# Patient Record
Sex: MALE | Race: WHITE | Employment: UNEMPLOYED | URBAN - METROPOLITAN AREA
[De-identification: names, ages, dates, MRNs, and addresses within clinical notes are randomized per-mention and may not be internally consistent; named-entity substitution may affect disease eponyms.]

---

## 2013-08-15 LAB
CHOLESTEROL, TOTAL: NORMAL
CHOLESTEROL/HDL RATIO: NORMAL
HDLC SERPL-MCNC: NORMAL MG/DL
LDL CHOLESTEROL CALCULATED: NORMAL
TRIGL SERPL-MCNC: NORMAL MG/DL
VLDLC SERPL CALC-MCNC: NORMAL MG/DL

## 2014-03-03 LAB

## 2019-07-25 ENCOUNTER — OFFICE VISIT (OUTPATIENT)
Dept: PHYSICAL MEDICINE AND REHAB | Age: 39
End: 2019-07-25
Payer: COMMERCIAL

## 2019-07-25 VITALS
HEIGHT: 72 IN | SYSTOLIC BLOOD PRESSURE: 138 MMHG | HEART RATE: 67 BPM | DIASTOLIC BLOOD PRESSURE: 95 MMHG | WEIGHT: 200 LBS | BODY MASS INDEX: 27.09 KG/M2

## 2019-07-25 DIAGNOSIS — M75.21 BICEPS TENDONITIS ON RIGHT: Primary | ICD-10-CM

## 2019-07-25 DIAGNOSIS — M19.011 ARTHRITIS OF RIGHT ACROMIOCLAVICULAR JOINT: ICD-10-CM

## 2019-07-25 PROCEDURE — 20550 NJX 1 TENDON SHEATH/LIGAMENT: CPT | Performed by: PHYSICAL MEDICINE & REHABILITATION

## 2019-07-25 PROCEDURE — 99204 OFFICE O/P NEW MOD 45 MIN: CPT | Performed by: PHYSICAL MEDICINE & REHABILITATION

## 2019-07-25 RX ORDER — LIDOCAINE HYDROCHLORIDE 10 MG/ML
2 INJECTION, SOLUTION INFILTRATION; PERINEURAL ONCE
Status: COMPLETED | OUTPATIENT
Start: 2019-07-25 | End: 2019-07-25

## 2019-07-25 RX ORDER — TRIAMCINOLONE ACETONIDE 40 MG/ML
40 INJECTION, SUSPENSION INTRA-ARTICULAR; INTRAMUSCULAR ONCE
Status: COMPLETED | OUTPATIENT
Start: 2019-07-25 | End: 2019-07-25

## 2019-07-25 RX ORDER — CITALOPRAM 10 MG/1
20 TABLET ORAL DAILY
Status: ON HOLD | COMMUNITY
Start: 2019-07-24 | End: 2021-09-02 | Stop reason: HOSPADM

## 2019-07-25 RX ORDER — LISINOPRIL 10 MG/1
10 TABLET ORAL DAILY
Refills: 0 | Status: ON HOLD | COMMUNITY
Start: 2019-04-17 | End: 2021-08-24 | Stop reason: ALTCHOICE

## 2019-07-25 RX ADMIN — LIDOCAINE HYDROCHLORIDE 2 ML: 10 INJECTION, SOLUTION INFILTRATION; PERINEURAL at 15:15

## 2019-07-25 RX ADMIN — TRIAMCINOLONE ACETONIDE 40 MG: 40 INJECTION, SUSPENSION INTRA-ARTICULAR; INTRAMUSCULAR at 15:16

## 2019-08-27 ENCOUNTER — TELEPHONE (OUTPATIENT)
Dept: PHYSICAL MEDICINE AND REHAB | Age: 39
End: 2019-08-27

## 2021-08-24 ENCOUNTER — HOSPITAL ENCOUNTER (INPATIENT)
Age: 41
LOS: 9 days | Discharge: HOME | DRG: 885 | End: 2021-09-02
Attending: STUDENT IN AN ORGANIZED HEALTH CARE EDUCATION/TRAINING PROGRAM | Admitting: PSYCHIATRY & NEUROLOGY
Payer: COMMERCIAL

## 2021-08-24 DIAGNOSIS — R45.851 SUICIDAL IDEATION: Primary | ICD-10-CM

## 2021-08-24 PROBLEM — F32.A DEPRESSION WITH SUICIDAL IDEATION: Status: ACTIVE | Noted: 2021-08-24

## 2021-08-24 LAB
ACETAMINOPHEN LEVEL: <5 MCG/ML (ref 10–30)
ALBUMIN SERPL-MCNC: 3.9 G/DL (ref 3.5–5.2)
ALP BLD-CCNC: 99 U/L (ref 40–129)
ALT SERPL-CCNC: 32 U/L (ref 0–40)
AMPHETAMINE SCREEN, URINE: NOT DETECTED
ANION GAP SERPL CALCULATED.3IONS-SCNC: 8 MMOL/L (ref 7–16)
AST SERPL-CCNC: 18 U/L (ref 0–39)
BARBITURATE SCREEN URINE: NOT DETECTED
BASOPHILS ABSOLUTE: 0.08 E9/L (ref 0–0.2)
BASOPHILS RELATIVE PERCENT: 0.9 % (ref 0–2)
BENZODIAZEPINE SCREEN, URINE: NOT DETECTED
BILIRUB SERPL-MCNC: 0.3 MG/DL (ref 0–1.2)
BILIRUBIN URINE: NEGATIVE
BLOOD, URINE: NEGATIVE
BUN BLDV-MCNC: 10 MG/DL (ref 6–20)
CALCIUM SERPL-MCNC: 9.5 MG/DL (ref 8.6–10.2)
CANNABINOID SCREEN URINE: POSITIVE
CHLORIDE BLD-SCNC: 105 MMOL/L (ref 98–107)
CLARITY: CLEAR
CO2: 28 MMOL/L (ref 22–29)
COCAINE METABOLITE SCREEN URINE: NOT DETECTED
COLOR: YELLOW
CREAT SERPL-MCNC: 0.9 MG/DL (ref 0.7–1.2)
EOSINOPHILS ABSOLUTE: 0.15 E9/L (ref 0.05–0.5)
EOSINOPHILS RELATIVE PERCENT: 1.6 % (ref 0–6)
ETHANOL: <10 MG/DL (ref 0–0.08)
FENTANYL SCREEN, URINE: NOT DETECTED
GFR AFRICAN AMERICAN: >60
GFR NON-AFRICAN AMERICAN: >60 ML/MIN/1.73
GLUCOSE BLD-MCNC: 98 MG/DL (ref 74–99)
GLUCOSE URINE: NEGATIVE MG/DL
HCT VFR BLD CALC: 45.7 % (ref 37–54)
HEMOGLOBIN: 15.6 G/DL (ref 12.5–16.5)
IMMATURE GRANULOCYTES #: 0.03 E9/L
IMMATURE GRANULOCYTES %: 0.3 % (ref 0–5)
INFLUENZA A: NOT DETECTED
INFLUENZA B: NOT DETECTED
KETONES, URINE: NEGATIVE MG/DL
LEUKOCYTE ESTERASE, URINE: NEGATIVE
LYMPHOCYTES ABSOLUTE: 2.7 E9/L (ref 1.5–4)
LYMPHOCYTES RELATIVE PERCENT: 29.1 % (ref 20–42)
Lab: ABNORMAL
MCH RBC QN AUTO: 30.8 PG (ref 26–35)
MCHC RBC AUTO-ENTMCNC: 34.1 % (ref 32–34.5)
MCV RBC AUTO: 90.3 FL (ref 80–99.9)
METHADONE SCREEN, URINE: NOT DETECTED
MONOCYTES ABSOLUTE: 0.8 E9/L (ref 0.1–0.95)
MONOCYTES RELATIVE PERCENT: 8.6 % (ref 2–12)
NEUTROPHILS ABSOLUTE: 5.51 E9/L (ref 1.8–7.3)
NEUTROPHILS RELATIVE PERCENT: 59.5 % (ref 43–80)
NITRITE, URINE: NEGATIVE
OPIATE SCREEN URINE: NOT DETECTED
OXYCODONE URINE: NOT DETECTED
PDW BLD-RTO: 11.9 FL (ref 11.5–15)
PH UA: 6.5 (ref 5–9)
PHENCYCLIDINE SCREEN URINE: NOT DETECTED
PLATELET # BLD: 390 E9/L (ref 130–450)
PMV BLD AUTO: 9.8 FL (ref 7–12)
POTASSIUM SERPL-SCNC: 4.2 MMOL/L (ref 3.5–5)
PROTEIN UA: NEGATIVE MG/DL
RBC # BLD: 5.06 E12/L (ref 3.8–5.8)
SALICYLATE, SERUM: <0.3 MG/DL (ref 0–30)
SARS-COV-2 RNA, RT PCR: NOT DETECTED
SODIUM BLD-SCNC: 141 MMOL/L (ref 132–146)
SPECIFIC GRAVITY UA: 1.01 (ref 1–1.03)
TOTAL PROTEIN: 7.6 G/DL (ref 6.4–8.3)
TRICYCLIC ANTIDEPRESSANTS SCREEN SERUM: NEGATIVE NG/ML
UROBILINOGEN, URINE: 0.2 E.U./DL
WBC # BLD: 9.3 E9/L (ref 4.5–11.5)

## 2021-08-24 PROCEDURE — 80053 COMPREHEN METABOLIC PANEL: CPT

## 2021-08-24 PROCEDURE — 80179 DRUG ASSAY SALICYLATE: CPT

## 2021-08-24 PROCEDURE — 1240000000 HC EMOTIONAL WELLNESS R&B

## 2021-08-24 PROCEDURE — 93005 ELECTROCARDIOGRAM TRACING: CPT | Performed by: PHYSICIAN ASSISTANT

## 2021-08-24 PROCEDURE — 6370000000 HC RX 637 (ALT 250 FOR IP): Performed by: PSYCHIATRY & NEUROLOGY

## 2021-08-24 PROCEDURE — 99284 EMERGENCY DEPT VISIT MOD MDM: CPT

## 2021-08-24 PROCEDURE — 80143 DRUG ASSAY ACETAMINOPHEN: CPT

## 2021-08-24 PROCEDURE — 80307 DRUG TEST PRSMV CHEM ANLYZR: CPT

## 2021-08-24 PROCEDURE — 87636 SARSCOV2 & INF A&B AMP PRB: CPT

## 2021-08-24 PROCEDURE — 85025 COMPLETE CBC W/AUTO DIFF WBC: CPT

## 2021-08-24 PROCEDURE — 81003 URINALYSIS AUTO W/O SCOPE: CPT

## 2021-08-24 PROCEDURE — 82077 ASSAY SPEC XCP UR&BREATH IA: CPT

## 2021-08-24 RX ORDER — MAGNESIUM HYDROXIDE/ALUMINUM HYDROXICE/SIMETHICONE 120; 1200; 1200 MG/30ML; MG/30ML; MG/30ML
30 SUSPENSION ORAL PRN
Status: DISCONTINUED | OUTPATIENT
Start: 2021-08-24 | End: 2021-09-02 | Stop reason: HOSPADM

## 2021-08-24 RX ORDER — ACETAMINOPHEN 325 MG/1
650 TABLET ORAL EVERY 6 HOURS PRN
Status: DISCONTINUED | OUTPATIENT
Start: 2021-08-24 | End: 2021-09-02 | Stop reason: HOSPADM

## 2021-08-24 RX ORDER — TRAZODONE HYDROCHLORIDE 50 MG/1
50 TABLET ORAL NIGHTLY PRN
Status: DISCONTINUED | OUTPATIENT
Start: 2021-08-24 | End: 2021-09-02 | Stop reason: HOSPADM

## 2021-08-24 RX ORDER — HALOPERIDOL 5 MG
5 TABLET ORAL EVERY 6 HOURS PRN
Status: DISCONTINUED | OUTPATIENT
Start: 2021-08-24 | End: 2021-09-02 | Stop reason: HOSPADM

## 2021-08-24 RX ORDER — HALOPERIDOL 5 MG/ML
5 INJECTION INTRAMUSCULAR EVERY 6 HOURS PRN
Status: DISCONTINUED | OUTPATIENT
Start: 2021-08-24 | End: 2021-09-02 | Stop reason: HOSPADM

## 2021-08-24 RX ORDER — TRAZODONE HYDROCHLORIDE 50 MG/1
50 TABLET ORAL NIGHTLY PRN
Status: DISCONTINUED | OUTPATIENT
Start: 2021-08-25 | End: 2021-08-24

## 2021-08-24 RX ORDER — NICOTINE 21 MG/24HR
1 PATCH, TRANSDERMAL 24 HOURS TRANSDERMAL DAILY
Status: DISCONTINUED | OUTPATIENT
Start: 2021-08-25 | End: 2021-08-25

## 2021-08-24 RX ORDER — ACETAMINOPHEN 325 MG/1
650 TABLET ORAL ONCE
Status: DISCONTINUED | OUTPATIENT
Start: 2021-08-24 | End: 2021-09-02 | Stop reason: HOSPADM

## 2021-08-24 RX ORDER — HYDROXYZINE PAMOATE 50 MG/1
50 CAPSULE ORAL 3 TIMES DAILY PRN
Status: DISCONTINUED | OUTPATIENT
Start: 2021-08-24 | End: 2021-09-02 | Stop reason: HOSPADM

## 2021-08-24 RX ADMIN — TRAZODONE HYDROCHLORIDE 50 MG: 50 TABLET ORAL at 22:42

## 2021-08-24 ASSESSMENT — SLEEP AND FATIGUE QUESTIONNAIRES
DO YOU USE A SLEEP AID: YES
DIFFICULTY ARISING: YES
DIFFICULTY STAYING ASLEEP: YES
DO YOU HAVE DIFFICULTY SLEEPING: YES
DIFFICULTY FALLING ASLEEP: YES
SLEEP PATTERN: INSOMNIA;DIFFICULTY FALLING ASLEEP
AVERAGE NUMBER OF SLEEP HOURS: 3
RESTFUL SLEEP: NO

## 2021-08-24 ASSESSMENT — LIFESTYLE VARIABLES: HISTORY_ALCOHOL_USE: NO

## 2021-08-24 ASSESSMENT — PATIENT HEALTH QUESTIONNAIRE - PHQ9: SUM OF ALL RESPONSES TO PHQ QUESTIONS 1-9: 17

## 2021-08-24 ASSESSMENT — PAIN SCALES - GENERAL: PAINLEVEL_OUTOF10: 0

## 2021-08-24 NOTE — ED NOTES
FIRST PROVIDER CONTACT ASSESSMENT NOTE                                                                                                Department of Emergency Medicine                                                      First Provider Note  21  3:06 PM EDT  NAME: Mango Cabrera  : 1980  MRN: [id number]    Chief Complaint: No chief complaint on file. History of Present Illness:   Mango Cabrera is a 41 y.o. male who presents to the ED for suicidal ideations for the last week. Patient states \"I just feel like everyone is out to get me. \"  Patient states \"I would shoot myself with a shotgun. \"  Patient states he recently started back on Celexa over the last 3 days. Patient denies any homicidal ideations. Patient states he took Remeron to help him sleep but is not on the medication any longer. Patient does not have a counselor follow with psychiatry. Patient states that he has struggled with anxiety and depression. Focused Physical Exam:  VS:    ED Triage Vitals [21 1504]   BP Temp Temp src Pulse Resp SpO2 Height Weight   -- 97.7 °F (36.5 °C) -- 88 -- 96 % -- --        General: Alert and in no apparent distress. Medical History:  has a past medical history of Hypertension. Surgical History:  has a past surgical history that includes Neck surgery; Hernia repair; Finger surgery (Left); and hernia repair. Social History:  reports that he has been smoking cigarettes. He has a 30.00 pack-year smoking history. He has quit using smokeless tobacco. He reports current alcohol use. Family History: family history includes COPD in his father; Hypertension in his father. Allergies: Patient has no known allergies.      Initial Plan of Care:  Initiate Treatment-Testing, Proceed toTreatment Area When Bed Available for ED Attending/MLP to Continue Care    -------------------------------------------------END OF FIRST PROVIDER CONTACT ASSESSMENT NOTE--------------------------------------------------------  Electronically signed by Natasha Smith PA-C   DD: 8/24/21       Natasha Smith PA-C  08/24/21 [id number] AndrewCHENG  08/24/21 [id number]

## 2021-08-24 NOTE — ED PROVIDER NOTES
Department of Emergency Medicine   ED  Provider Note  Admit Date/RoomTime: 8/24/2021  3:13 PM  ED Room: [address] [place] [address]          History of Present Illness:  8/24/21, Time: 4:19 PM EDT  Chief Complaint   Patient presents with    Suicidal     +SI/-HI, plan to kill self with a gun         Aditi Early is a 41 y.o. male presenting to the ED for Suicidal ideation with plan to shoot himself with a shotgun. The patient is self aware. Denies hallucinations or delusions. Ever since losing his job a couple of years ago he has had thoughts that he is hopeless as well as thoughts of anxiety and depression given that he has not been employed. He feels he is a burden to his family as he is not contributing. Denies any history of suicide attempt in the past but has struggled with anxiety and depression. The patient's wife is at the bedside who states that she has noticed a progressive worsening of his depression as well as he admitting that he has suicidal ideation to her. They do have firearms that are unsecured in the home that he would have access to as well. Review of Systems:  Constitutional: Denies fevers  Eyes: Denies blurry vision  ENT: Denies sore throat  Cardiovascular: Denies chest pain  Respiratory: Denies shortness of breath  Gastrointestinal: Denies abdominal pain  Genitourinary: Denies dysuria  Musculoskeletal: Denies myalgias  Integumentary: Denies rashes  Psychiatric: Positive for suicidal ideation    --------------------------------------------- PAST HISTORY ---------------------------------------------  Past Medical History:  has a past medical history of Hypertension. Past Surgical History:  has a past surgical history that includes Neck surgery; Hernia repair; Finger surgery (Left); and hernia repair. Social History:  reports that he has been smoking cigarettes. He has a 30.00 pack-year smoking history. He has quit using smokeless tobacco. He reports current alcohol use.     Family History: family history includes COPD in his father; Hypertension in his father. . Unless otherwise noted, family history is non contributory    The patients home medications have been reviewed. Allergies: Patient has no known allergies. I have reviewed the past medical history, past surgical history, social history, and family history    ---------------------------------------------------PHYSICAL EXAM--------------------------------------    Constitutional: Appears in no distress  Head: Normocephalic  Eyes: No conjunctial injection  ENT: Moist mucous membranes  Neck: Trachea midline  Respiratory: Nonlabored respirations, no tachypnea  Cardiovascular: Regular rate. Regular rhythm. No murmurs, no gallops, no rubs. Gastrointestinal: Nonsistendended abdomen. Musculoskeletal: Moves all extremities  Skin: No diaphoresis on visualized skin  Neurologic: Alert, symmetric facies, no aphasia  Psychiatric: Patient has a linear thought process nontangential, no delusions or hallucinations, has suicidal ideation with plan, denies homicidal ideation    -------------------------------------------------- RESULTS -------------------------------------------------  I have personally reviewed all laboratory and imaging results for this patient. Results are listed below.      LABS: (Lab results interpreted by me)  Results for orders placed or performed during the hospital encounter of 08/24/21   COVID-19 & Influenza Combo    Specimen: Nasopharyngeal Swab   Result Value Ref Range    SARS-CoV-2 RNA, RT PCR NOT DETECTED NOT DETECTED    INFLUENZA A NOT DETECTED NOT DETECTED    INFLUENZA B NOT DETECTED NOT DETECTED   Comprehensive Metabolic Panel   Result Value Ref Range    Sodium 141 132 - 146 mmol/L    Potassium 4.2 3.5 - 5.0 mmol/L    Chloride 105 98 - 107 mmol/L    CO2 28 22 - 29 mmol/L    Anion Gap 8 7 - 16 mmol/L    Glucose 98 74 - 99 mg/dL    BUN 10 6 - 20 mg/dL    CREATININE 0.9 0.7 - 1.2 mg/dL    GFR Non-African American >60 >=60 mL/min/1.73    GFR African American >60     Calcium 9.5 8.6 - 10.2 mg/dL    Total Protein 7.6 6.4 - 8.3 g/dL    Albumin 3.9 3.5 - 5.2 g/dL    Total Bilirubin 0.3 0.0 - 1.2 mg/dL    Alkaline Phosphatase 99 40 - 129 U/L    ALT 32 0 - 40 U/L    AST 18 0 - 39 U/L   CBC Auto Differential   Result Value Ref Range    WBC 9.3 4.5 - 11.5 E9/L    RBC 5.06 3.80 - 5.80 E12/L    Hemoglobin 15.6 12.5 - 16.5 g/dL    Hematocrit 45.7 37.0 - 54.0 %    MCV 90.3 80.0 - 99.9 fL    MCH 30.8 26.0 - 35.0 pg    MCHC 34.1 32.0 - 34.5 %    RDW 11.9 11.5 - 15.0 fL    Platelets 000 544 - 363 E9/L    MPV 9.8 7.0 - 12.0 fL    Neutrophils % 59.5 43.0 - 80.0 %    Immature Granulocytes % 0.3 0.0 - 5.0 %    Lymphocytes % 29.1 20.0 - 42.0 %    Monocytes % 8.6 2.0 - 12.0 %    Eosinophils % 1.6 0.0 - 6.0 %    Basophils % 0.9 0.0 - 2.0 %    Neutrophils Absolute 5.51 1.80 - 7.30 E9/L    Immature Granulocytes # 0.03 E9/L    Lymphocytes Absolute 2.70 1.50 - 4.00 E9/L    Monocytes Absolute 0.80 0.10 - 0.95 E9/L    Eosinophils Absolute 0.15 0.05 - 0.50 E9/L    Basophils Absolute 0.08 0.00 - 0.20 E9/L   Serum Drug Screen   Result Value Ref Range    Ethanol Lvl <10 mg/dL    Acetaminophen Level <5.0 (L) 10.0 - 57.8 mcg/mL    Salicylate, Serum <4.9 0.0 - 30.0 mg/dL    TCA Scrn NEGATIVE Cutoff:300 ng/mL   Urine Drug Screen   Result Value Ref Range    Amphetamine Screen, Urine NOT DETECTED Negative <1000 ng/mL    Barbiturate Screen, Ur NOT DETECTED Negative < 200 ng/mL    Benzodiazepine Screen, Urine NOT DETECTED Negative < 200 ng/mL    Cannabinoid Scrn, Ur POSITIVE (A) Negative < 50ng/mL    Cocaine Metabolite Screen, Urine NOT DETECTED Negative < 300 ng/mL    Opiate Scrn, Ur NOT DETECTED Negative < 300ng/mL    PCP Screen, Urine NOT DETECTED Negative < 25 ng/mL    Methadone Screen, Urine NOT DETECTED Negative <300 ng/mL    Oxycodone Urine NOT DETECTED Negative <100 ng/mL    FENTANYL SCREEN, URINE NOT DETECTED Negative <1 ng/mL    Drug Screen Comment: see below Urinalysis   Result Value Ref Range    Color, UA Yellow Straw/Yellow    Clarity, UA Clear Clear    Glucose, Ur Negative Negative mg/dL    Bilirubin Urine Negative Negative    Ketones, Urine Negative Negative mg/dL    Specific Gravity, UA 1.015 1.005 - 1.030    Blood, Urine Negative Negative    pH, UA 6.5 5.0 - 9.0    Protein, UA Negative Negative mg/dL    Urobilinogen, Urine 0.2 <2.0 E.U./dL    Nitrite, Urine Negative Negative    Leukocyte Esterase, Urine Negative Negative   EKG 12 Lead   Result Value Ref Range    Ventricular Rate 62 BPM    Atrial Rate 62 BPM    P-R Interval 152 ms    QRS Duration 98 ms    Q-T Interval 394 ms    QTc Calculation (Bazett) 399 ms    P Axis 54 degrees    R Axis 66 degrees    T Axis 59 degrees   ,       RADIOLOGY:  Interpreted by Radiologist unless otherwise specified  No orders to display         ------------------------- NURSING NOTES AND VITALS REVIEWED ---------------------------   The nursing notes within the ED encounter and vital signs as below have been reviewed by myself  BP (!) 137/100   Pulse 90   Temp 97.7 °F (36.5 °C)   Resp 17   SpO2 97%     Oxygen Saturation Interpretation: Normal    The patients available past medical records and past encounters were reviewed. ------------------------------ ED COURSE/MEDICAL DECISION MAKING----------------------  Medications   acetaminophen (TYLENOL) tablet 650 mg (has no administration in time range)        This patient's ED course included:a personal history and physicial examination    This patient has remained hemodynamically stable during their ED course. Consultations:  Social Work      Medical Decision Making:   Patient presents with suicidal ideation with plan. Vital signs interpreted by me as mildly hypertensive otherwise normal.    History and physical examination findings consistent with suicidal ideation with plan. I feel that the patient is a danger to himself but not currently danger to others.   I have filled out a pink slip and he will be evaluated by psychiatry. Labs: Unremarkable and reviewed by myself. EKG:  EKG obtained on August 24, 2021 at 1630 9 PM remarkable for ventricular rate of 62 bpm QRS duration is within normal limits at 90 ms QT/QTc is normal at 394/399 ms there are no ST segment elevations or depressions. No T wave inversions. This is interpreted by myself as normal sinus rhythm without ischemia or infarct. Patient is cleared medically for evaluation and further psychiatric placement. Counseling: The emergency provider has spoken with the patient and spouse/SO and discussed todays results, in addition to providing specific details for the plan of care and counseling regarding the diagnosis and prognosis. Questions are answered at this time and they are agreeable with the plan.       --------------------------------- IMPRESSION AND DISPOSITION ---------------------------------    IMPRESSION  1. Suicidal ideation        DISPOSITION  Disposition: Cleared medically for psychiatric evaluation  Patient condition is stable    Dr. Brian DAWSON, am the primary provider of record    Brian Tong DO  Emergency Medicine    NOTE: This report was transcribed using voice recognition software.  Every effort was made to ensure accuracy; however, inadvertent computerized transcription errors may be present         Rita Ohara DO  08/24/21 [id number]

## 2021-08-25 PROBLEM — F31.5 BIPOLAR AFFECTIVE DISORDER, DEPRESSED, SEVERE, WITH PSYCHOTIC BEHAVIOR (HCC): Status: ACTIVE | Noted: 2021-08-25

## 2021-08-25 LAB
EKG ATRIAL RATE: 62 BPM
EKG P AXIS: 54 DEGREES
EKG P-R INTERVAL: 152 MS
EKG Q-T INTERVAL: 394 MS
EKG QRS DURATION: 98 MS
EKG QTC CALCULATION (BAZETT): 399 MS
EKG R AXIS: 66 DEGREES
EKG T AXIS: 59 DEGREES
EKG VENTRICULAR RATE: 62 BPM

## 2021-08-25 PROCEDURE — 1240000000 HC EMOTIONAL WELLNESS R&B

## 2021-08-25 PROCEDURE — 93010 ELECTROCARDIOGRAM REPORT: CPT | Performed by: INTERNAL MEDICINE

## 2021-08-25 PROCEDURE — 6370000000 HC RX 637 (ALT 250 FOR IP): Performed by: PSYCHIATRY & NEUROLOGY

## 2021-08-25 PROCEDURE — 6370000000 HC RX 637 (ALT 250 FOR IP): Performed by: NURSE PRACTITIONER

## 2021-08-25 PROCEDURE — 99221 1ST HOSP IP/OBS SF/LOW 40: CPT | Performed by: NURSE PRACTITIONER

## 2021-08-25 RX ORDER — DIVALPROEX SODIUM 250 MG/1
250 TABLET, DELAYED RELEASE ORAL EVERY 12 HOURS SCHEDULED
Status: DISCONTINUED | OUTPATIENT
Start: 2021-08-25 | End: 2021-08-27

## 2021-08-25 RX ORDER — OLANZAPINE 5 MG/1
5 TABLET ORAL NIGHTLY
Status: DISCONTINUED | OUTPATIENT
Start: 2021-08-25 | End: 2021-08-26

## 2021-08-25 RX ADMIN — NICOTINE POLACRILEX 4 MG: 2 GUM, CHEWING BUCCAL at 10:15

## 2021-08-25 RX ADMIN — DIVALPROEX SODIUM 250 MG: 250 TABLET, DELAYED RELEASE ORAL at 20:39

## 2021-08-25 RX ADMIN — NICOTINE POLACRILEX 2 MG: 2 GUM, CHEWING BUCCAL at 16:51

## 2021-08-25 RX ADMIN — OLANZAPINE 5 MG: 5 TABLET, FILM COATED ORAL at 20:39

## 2021-08-25 RX ADMIN — HYDROXYZINE PAMOATE 50 MG: 50 CAPSULE ORAL at 20:39

## 2021-08-25 RX ADMIN — TRAZODONE HYDROCHLORIDE 50 MG: 50 TABLET ORAL at 20:39

## 2021-08-25 RX ADMIN — DIVALPROEX SODIUM 250 MG: 250 TABLET, DELAYED RELEASE ORAL at 11:36

## 2021-08-25 ASSESSMENT — LIFESTYLE VARIABLES: HISTORY_ALCOHOL_USE: NO

## 2021-08-25 ASSESSMENT — SLEEP AND FATIGUE QUESTIONNAIRES
AVERAGE NUMBER OF SLEEP HOURS: 5
DIFFICULTY FALLING ASLEEP: YES
DO YOU USE A SLEEP AID: YES
DIFFICULTY ARISING: NO
DIFFICULTY STAYING ASLEEP: YES
RESTFUL SLEEP: NO
SLEEP PATTERN: RESTLESSNESS;DIFFICULTY FALLING ASLEEP;DISTURBED/INTERRUPTED SLEEP
DO YOU HAVE DIFFICULTY SLEEPING: YES

## 2021-08-25 ASSESSMENT — PATIENT HEALTH QUESTIONNAIRE - PHQ9: SUM OF ALL RESPONSES TO PHQ QUESTIONS 1-9: 24

## 2021-08-25 ASSESSMENT — PAIN SCALES - GENERAL
PAINLEVEL_OUTOF10: 0
PAINLEVEL_OUTOF10: 0

## 2021-08-25 NOTE — PROGRESS NOTES
Admits to SI but contracts on unit  Cooperative with meds  Isolative to room  Will continue to monitor

## 2021-08-25 NOTE — CARE COORDINATION
Biopsychosocial Assessment Note    Social work met with patient to complete the biopsychosocial assessment and CSSR-S. Mental Status Exam: pt alert&oriented x4. Pt cooperative. Mood depressed, flat, blunt affect. Eye contact good speech clear. Pt paranoid. Insight/judgement poor. Pt denies HI/AVH, reports SI with plans and intentions to shoot himself with a gun. Chief Complaint: Alessandro Looney is a 40 yo male presenting to the ED for being suicidal, +SI/-HI, plan to kill self with a gun\"    Patient Report: pt reports feeling suicidal with a plan to shoot himself with one of his shot guns for the past week. 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 Pt reports feeling as though people have been out to get him for the past year, feels they are sabotaging his employment by telling his employers that he has anger management problems. Pt does not feel he has this problem. Pt has a past hx of mental health treatment, reports he stopped taking his psych meds a year ago and then just started taking them within the past week again. Pt denies past hx of SI, attempts, self injurious behaviors, or psych admissions. Pt denies AOD hx, reports he has a medical marijuana card that was prescribed for his PTSD. When asked why he was diagnosed with PTSD, pt response was difficult to follow, stating that he thinks someone called his psychiatrist and told him to give him the diagnosis and then change it to MDD. Pt denies hx of any abuse. Pt father passed away 3months ago from a medical condition.      Gender  [x] Male [] Female [] Transgender  [] Other    Sexual Orientation    [x] Heterosexual [] Homosexual [] Bisexual [] Other    Suicidal Ideation  [] Past [x] Present [] Denies     Homicidal Ideation  [] Past [] Present [x] Denies     Hallucinations/Delusions (Specify type)  [] Reports [x] Denies     Substance Use/Alcohol Use/Addiction has

## 2021-08-25 NOTE — H&P
Department of Psychiatry  History and Physical - Adult       Patient personally seen and examined by me and mental status exam performed. I agree the below assessment by the medical student. Psychomotor evaluation revealed no agitation retardation any abnormal movements. His eye contact is fair his speech is normal rate rhythm and tone. His mood is \"I feel okay. \"  Affect is mood incongruent flat and blunted. His thought process is illogical at times. Thought contents with paranoia denies auditory visual loose [place]. He denies suicidal homicidal ideations intent or plan impulse control is poor cognitive function peers to be his baseline his insight judgment is limited he is alert oriented time place and person            CHIEF COMPLAINT: \"I really thought about shooting myself and ending it\"    Patient was seen after discussing with the treatment team and reviewing the chart    CIRCUMSTANCES OF ADMISSION: presented to the ED with suicidal ideation with a plan to shot himself brought in by himself    HISTORY OF PRESENT ILLNESS:      The patient is a 41 y.o.   male who has 2 children, lives with his wife and children, works at [employer], has a significant past psychiatric history of depression, does not take any current psychiatric medication, has never been admitted into an inpatient psychiatric hospital, does not see an outpatient psychiatrist, and presented to the ED with suicidal ideation with a plan to shot himself brought in by himself. Urine toxicology screening done in the ED was positive for cannabinoid. The patient was then medically cleared and admitted to [facility] adult psychiatric unit for further psychiatric evaluation, assessment, and treatment. Upon assessment today, the patient states that he Chantell Kim thought about shooting myself and ending it. \" Pt states that he feels as if he is a burden to his family and that there is no hope for the future.  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 He states that he becomes worried that his past employers will inform his future employers that he has anger issues due to some complaints from inmates on how he treated them. The pt states that he then told his wife that he was having these thoughts and that he was not feeling well and felt depressed. He then brought himself to the ED after prompting from his wife. Pt reports having had feelings of depression for the past 13 years, as well as anxiety. Reports having poor sleep and not good appetite. Endorses having poor concentration, low energy, and a loss of interest. Endorses feelings of hopelessness, worthlessness, and guilt. Denies a history of manic episodes. Endorses impulstivity and becoming easily irritated, stating he \"has lost my temper\" before. Denies having trouble keeping relationships. Endorses feeling easily abandoned at times and feeling empty inside. Denies current suicidal ideation, intent, or plan. Denies homicidal ideation, intent, or plan. Denies auditory or visual hallucinations. Endorses feelings of being a little paranoid about his past employers. Past Psychiatric History: The pt states he has never been admitted into an inpatient psychiatric hospital before. He does not currently see an outpatient counselor or psychiatrist. However, he did start going to a center for counseling and see a psychiatrist about 3 and a half years ago for depression. He states that he was prescribed Celexa and Remeron at the time. Pt reports stopping the medication about a year ago after his provider stopped giving him a prescription due to no longer going to the center for counseling. Pt states he has been diagnosed with MDD before. Denies previous suicide attempts or a history of intentional self-injurious behaviors. Denies a family history of psychiatric illness or suicide.     Legal History: Pt states he was charged with domestic violence and threats against his ex-wife 6-8 years ago. He did not go to care home for the incidence. Denies currently being on probation or parole. Substance Use History: The pt states that he has smoked marijuana in the past. He reports quitting about 2 weeks ago and was previously using about twice a week. The pt denies drinking alcohol excessively. Pt has a history of smoking 0.5 ppd since he was 23 y.o. Personal Family and Social History: Pt grew up in [place] an was raised by both of his parents. He has a twin brother and a younger sister. He states that he had a good childhood. He talks to his siblings and his parents and called them a good support system. He denies ever experiencing any type of abuse. He graduated high school and has been going to [employer] school. He currently works at [employer]. However, he states that he is about to quit due to worrying about what his past employers will tell his current employers. He is currently  and has been  once before. He has 2 kids from his current marriage and he is living with his wife and children. The pt states that he does have guns at home. Past Medical History:        Diagnosis Date    Hypertension        Medications Prior to Admission:   Medications Prior to Admission: citalopram (CELEXA) 10 MG tablet, Take 20 mg by mouth daily    Past Surgical History:        Procedure Laterality Date    FINGER SURGERY Left     left thumb    HERNIA REPAIR      HERNIA REPAIR      inguinal age 9    NECK SURGERY         Allergies:   Patient has no known allergies. Family History  Family History   Problem Relation Age of Onset    Hypertension Father     COPD Father              EXAMINATION:    REVIEW OF SYSTEMS:    ROS:  [x] All negative/unchanged except if checked.  Explain positive(checked items) below:  [] Constitutional  [] Eyes  [] Ear/Nose/Mouth/Throat  [] Respiratory  [] CV  [] GI  []   [] Musculoskeletal  [] Skin/Breast  [] Neurological  [] Endocrine  [] Heme/Lymph  [] Allergic/Immunologic    Explanation:     Vitals:  BP (!) 113/59   Pulse 79   Temp 98.7 °F (37.1 °C) (Oral)   Resp 16   Ht 6' (1.829 m)   Wt 200 lb (90.7 kg)   SpO2 98%   BMI 27.12 kg/m²      Physical Examination:   Head: x  Atraumatic: x normocephalic  Skin and Mucosa        Moist x  Dry   Pale  x Normal   Neck:  Thyroid  Palpable   x  Not palpable   venus distention   adenopathy   Chest: x Clear   Rhonchi     Wheezing   CV:  xS1   xS2    xNo murmer   Abdomen:  x  Soft    Tender    Viceromegaly   Extremities:  x No Edema     Edema     Cranial Nerves Examination:   CN II:   xPupils are reactive to light  Pupils are non reactive to light  CN III, IV, VI:  xNo eye deviation    No diplopia or ptosis   CN V:    xFacial Sensation is intact     Facial Sensation is not intact   CN IIIV:   x Hearing is normal to rubbing fingers   CN IX, X:     xNormal gag reflex and phonation   CN XI:   xShoulder shrug and neck rotation is normal  CNXII:    xTongue is midline no deviation or atrophy    Mental Status Examination:    Level of consciousness:  within normal limits   Appearance:  well-appearing, good grooming and good hygiene  Behavior/Motor:  no abnormalities noted  Attitude toward examiner:  cooperative, fair eye contact and withdrawn  Speech:  normal rate, normal volume and monotone   Mood: \"I am depressed\"  Affect:  mood congruent and flat  Thought processes:  linear   Thought content:  Shows some paranoia in relation to his work, Devoid of auditory or visual hallucinations, delusions, or any other perceptual abnormalities, Denies current suicidal ideation, intent, or plan, Denies homicidal ideation, intent, or plan  Cognition:  oriented to person, place, and time   Concentration intact  Memory intact  Insight poor  Judgement poor   Fund of Knowledge good      DIAGNOSIS:  Bipolar affective disorder, depressed, severe, with psychotic behavior          LABS: REVIEWED TODAY:  Recent Labs     08/24/21  1632   WBC 9.3   HGB 15.6        Recent Labs     08/24/21  1632      K 4.2      CO2 28   BUN 10   CREATININE 0.9   GLUCOSE 98     Recent Labs     08/24/21  1632   BILITOT 0.3   ALKPHOS 99   AST 18   ALT 32     Lab Results   Component Value Date    LABAMPH NOT DETECTED 08/24/2021    BARBSCNU NOT DETECTED 08/24/2021    LABBENZ NOT DETECTED 08/24/2021    LABMETH NOT DETECTED 08/24/2021    OPIATESCREENURINE NOT DETECTED 08/24/2021    PHENCYCLIDINESCREENURINE NOT DETECTED 08/24/2021    ETOH <10 08/24/2021     No results found for: TSH, FREET4  No results found for: LITHIUM  No results found for: VALPROATE, CBMZ  No results found for: LITHIUM, VALPROATE      Radiology No results found. TREATMENT PLAN:    Risk Management: Based on the diagnosis and assessment biopsychosocial treatment model was presented to the patient and was given the opportunity to ask any question. The patient was agreeable to the plan and all the patient's questions were answered to the patient's satisfaction. I discussed with the patient the risk, benefit, alternative and common side effects for the proposed medication treatment. The patient is consenting to this treatment. Collateral Information:  Will obtain collateral information from the family or friends. Will obtain medical records as appropriate from out patient providers  Will consult the hospitalist for a physical exam to rule out any co-morbid physical condition. Patient's diagnosis, treatment plan, medication management was formulated at the end of evaluation and after reviewing relevant documentation. Patient was seen directly by myself and Dr. Chuckie Verduzco  Depakote 250 mg BID  Zyprexa 5 mg nightly    Prn Haldol 5mg and Vistaril 50mg q6hr for extreme agitation.   Trazodone as ordered for insomnia  Vistaril as ordered for anxiety      Psychotherapy:   Encourage participation in milieu and group therapy  Individual therapy as needed              Behavioral Services  Medicare Certification Upon Admission    I certify that this patient's inpatient psychiatric hospital admission is medically necessary for:    [x] (1) Treatment which could reasonably be expected to improve this patient's condition,       [] (2) Or for diagnostic study;     AND     [x](2) The inpatient psychiatric services are provided while the individual is under the care of a physician and are included in the individualized plan of care.     Estimated length of stay/service 3 to 7 days based on stability    Plan for post-hospital care outpatient psychiatric and counseling services    Electronically signed by CALEB Choudhary CNP on 1/62/4117 at 3:27 PM        Electronically signed by Thad Moore on 8/25/2021 at 10:32 AM

## 2021-08-25 NOTE — ED NOTES
Evon Enriquez accepted patient. Report called to Janet Quiñones. Pt calm and cooperative.  Transport  pending      Zo Toussaint RN  08/24/21 0615

## 2021-08-25 NOTE — BH NOTE
[address] 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 not doing well in school stopped counseling about 1 yr ago had some celexa tabs left over restarted taking them this week oriented to unit and room med with trazadone 50 mg po prn to bed    Admission Type:   Admission Type: Involuntary    Reason for admission:  Reason for Admission: \"I wasn't feling safe\"    PATIENT STRENGTHS:  Strengths: Communication, Social Skills    Patient Strengths and Limitations:  Limitations: Difficulty problem solving/relies on others to help solve problems, Difficult relationships / poor social skills    Addictive Behavior:   Addictive Behavior  In the past 3 months, have you felt or has someone told you that you have a problem with:  : None  Do you have a history of Chemical Use?: No  Do you have a history of Alcohol Use?: No  Do you have a history of Street Drug Abuse?: No (pt denied tox screen +MJ)  Histroy of Prescripton Drug Abuse?: No    Medical Problems:   Past Medical History:   Diagnosis Date    Hypertension        Status EXAM:  Status and Exam  Normal: Yes  Facial Expression: Avoids Gaze  Affect: Appropriate  Mood:Normal: Yes  Motor Activity:Normal: Yes  Interview Behavior: Cooperative  Preception: [place] to Person, [place] to Time, [place] to Place, [place] to Situation  Attention:Normal: Yes  Thought Processes:  (WNL)  Thought Content:Normal: Yes  Hallucinations: None  Memory:Normal: Yes  Insight and Judgment: Yes  Present Suicidal Ideation: Yes  Present Homicidal Ideation: No    Tobacco Screening:  Practical Counseling, on admission, lyle X, if applicable and completed (first 3 are required if patient doesn't refuse):             (x )  Recognizing danger situations (included triggers and roadblocks)                    (x )  Coping skills (new ways to manage stress, exercise, relaxation techniques, changing routine, distraction)                                                           (x )  Basic information about quitting (benefits of quitting, techniques in how to quit, available resources  ( ) Referral for counseling faxed to [facility]                                           ( ) Patient refused counseling  ( ) Patient has not smoked in the last 30 days    Metabolic Screening:    No results found for: LABA1C    No results found for: CHOL  No results found for: TRIG  No results found for: HDL  No components found for: LDLCAL  No results found for: LABVLDL      Body mass index is 27.12 kg/m². BP Readings from Last 2 Encounters:   08/24/21 139/87   07/25/19 (!) 138/95           Pt admitted with followings belongings:  Clothing:  Footwear, Pants, Shirt, Undergarments (Comment), Other (Comment) (hat)         Pancho Castorena RN

## 2021-08-25 NOTE — ED NOTES
Emergency Department [facility]FFILIATE OF [facility] Biopsychosocial Assessment Note    Pt is pink slipped by ED Doc    Chief Complaint:     Pt is a 40 yo male presenting to the ED for being suicidal, +SI/-HI, plan to kill self with a gun    MSE:    Pt is anxious, oriented x 4, alert, flat affect, good eye contact, pressured/low speech, admits to SI w/plan, denies HI and AVH, reports poor sleep and appetite. Clinical Summary/History:     Pt reports a  hx of depression and anxiety, Pt is not currently connected with outpatient [place] [address] services, Pt is not on  meds at this time. Pt reports ongoing depression since he quit his job 2 years ago. Pt reports he is a burden to his family and is suicidal with a plan to shoot himself with a shot gun which Pt does own. Pt denies AOD    Gender  [x] Male [] Female [] Transgender  [] Other    Sexual Orientation    [x] Heterosexual [] Homosexual [] Bisexual [] Other    Suicidal Behavioral: CSSR-S Complete. [x] Reports:    [] Past [x] Present   [] Denies    Homicidal/ Violent Behavior  [] Reports:   [] Past [] Present   [] Denies     Hallucinations/Delusions   [] Reports:   [x] Denies     Substance Use/Alcohol Use/Addiction: SBIRT Screen Complete. [] Reports:   [x] Denies     Trauma History  [] Reports:  [x] Denies     Collateral Information:     SHELL JENI spoke with Pt's wife [facility], who informed that Pt has also been paranoid believing that the phones are tapped and that people have been watching him.     Level of Care/Disposition Plan  [] Home:   [] Outpatient Provider:   [] Crisis Unit:   [x] Inpatient Psychiatric Unit:  [] Other:     SHELL SW will pursue inpatient admission     IRIS Shearer, [facility]  08/24/21 2033

## 2021-08-26 LAB
CHOLESTEROL, TOTAL: 117 MG/DL (ref 0–199)
HBA1C MFR BLD: 5.3 % (ref 4–5.6)
HDLC SERPL-MCNC: 34 MG/DL
LDL CHOLESTEROL CALCULATED: 65 MG/DL (ref 0–99)
TRIGL SERPL-MCNC: 88 MG/DL (ref 0–149)
VLDLC SERPL CALC-MCNC: 18 MG/DL

## 2021-08-26 PROCEDURE — 6370000000 HC RX 637 (ALT 250 FOR IP): Performed by: PSYCHIATRY & NEUROLOGY

## 2021-08-26 PROCEDURE — 36415 COLL VENOUS BLD VENIPUNCTURE: CPT

## 2021-08-26 PROCEDURE — 6370000000 HC RX 637 (ALT 250 FOR IP): Performed by: NURSE PRACTITIONER

## 2021-08-26 PROCEDURE — 80061 LIPID PANEL: CPT

## 2021-08-26 PROCEDURE — 1240000000 HC EMOTIONAL WELLNESS R&B

## 2021-08-26 PROCEDURE — 99232 SBSQ HOSP IP/OBS MODERATE 35: CPT | Performed by: NURSE PRACTITIONER

## 2021-08-26 PROCEDURE — 83036 HEMOGLOBIN GLYCOSYLATED A1C: CPT

## 2021-08-26 RX ORDER — DIAZEPAM 5 MG/1
5 TABLET ORAL EVERY 6 HOURS PRN
Status: DISCONTINUED | OUTPATIENT
Start: 2021-08-26 | End: 2021-08-28

## 2021-08-26 RX ORDER — OLANZAPINE 10 MG/1
10 TABLET ORAL NIGHTLY
Status: DISCONTINUED | OUTPATIENT
Start: 2021-08-26 | End: 2021-08-28

## 2021-08-26 RX ADMIN — NICOTINE POLACRILEX 4 MG: 2 GUM, CHEWING BUCCAL at 12:49

## 2021-08-26 RX ADMIN — TRAZODONE HYDROCHLORIDE 50 MG: 50 TABLET ORAL at 20:59

## 2021-08-26 RX ADMIN — DIVALPROEX SODIUM 250 MG: 250 TABLET, DELAYED RELEASE ORAL at 11:09

## 2021-08-26 RX ADMIN — ACETAMINOPHEN 650 MG: 325 TABLET ORAL at 22:23

## 2021-08-26 RX ADMIN — OLANZAPINE 10 MG: 10 TABLET, FILM COATED ORAL at 20:58

## 2021-08-26 RX ADMIN — NICOTINE POLACRILEX 4 MG: 2 GUM, CHEWING BUCCAL at 21:31

## 2021-08-26 RX ADMIN — NICOTINE POLACRILEX 4 MG: 2 GUM, CHEWING BUCCAL at 17:32

## 2021-08-26 RX ADMIN — HYDROXYZINE PAMOATE 50 MG: 50 CAPSULE ORAL at 20:58

## 2021-08-26 RX ADMIN — DIVALPROEX SODIUM 250 MG: 250 TABLET, DELAYED RELEASE ORAL at 20:58

## 2021-08-26 ASSESSMENT — PAIN SCALES - GENERAL
PAINLEVEL_OUTOF10: 0
PAINLEVEL_OUTOF10: 0
PAINLEVEL_OUTOF10: 3

## 2021-08-26 NOTE — PLAN OF CARE
Problem: Altered Mood, Psychotic Behavior:  Goal: Able to verbalize reality based thinking  Description: Able to verbalize reality based thinking  Outcome: Ongoing  Goal: Ability to interact with others will improve  Description: Ability to interact with others will improve  Outcome: Ongoing           Patient low profile. Evasive with conversation. Watchful and suspicious. Still focused on government. Denies suicidal and homicidal thoughts.

## 2021-08-26 NOTE — BH NOTE
Up at NS affect flat  Sad and depressed denies any Si today feels helpless hopeless at this time starting medication tonight emotional support given

## 2021-08-26 NOTE — PROGRESS NOTES
BEHAVIORAL HEALTH FOLLOW-UP NOTE     8/26/2021     Patient was seen and examined in person, Chart reviewed   Patient's case discussed with staff/team    Chief Complaint: Paranoid    Interim History: Patient seen in his room he is very paranoid and guarded. He continues to bleed at the [address] is out to get him. Staff reports he is makes multiple paranoid statements. He is currently denying SI/HI intent or plan however he called his wife at work to threaten suicide while he was watching the children. He shows very poor limits and judgment to hospitalization need for treatment.   I such as her not attending groups or socializing with peers      Appetite:   [x] Normal/Unchanged  [] Increased  [] Decreased      Sleep:       [x] Normal/Unchanged  [] Fair       [] Poor              Energy:    [x] Normal/Unchanged  [] Increased  [] Decreased        SI [] Present  [x] Absent    HI  []Present  [x] Absent     Aggression:  [] yes  [x] no    Patient is [x] able  [] unable to CONTRACT FOR SAFETY     PAST MEDICAL/PSYCHIATRIC HISTORY:   Past Medical History:   Diagnosis Date    Hypertension        FAMILY/SOCIAL HISTORY:  Family History   Problem Relation Age of Onset    Hypertension Father     COPD Father      Social History     Socioeconomic History    Marital status:      Spouse name: Not on file    Number of children: Not on file    Years of education: Not on file    Highest education level: Not on file   Occupational History    Not on file   Tobacco Use    Smoking status: Current Every Day Smoker     Packs/day: 1.50     Years: 20.00     Pack years: 30.00     Types: Cigarettes    Smokeless tobacco: Former User   Substance and Sexual Activity    Alcohol use: Yes     Comment: occ    Drug use: Not on file    Sexual activity: Not on file   Other Topics Concern    Not on file   Social History Narrative    Not on file     Social Determinants of Health     Financial Resource Strain:     Difficulty of Paying Living Expenses:    Food Insecurity:     Worried About Running Out of Food in the Last Year:     [address] in the Last Year:    Transportation Needs:     Lack of Transportation (Medical):  Lack of Transportation (Non-Medical):    Physical Activity:     Days of Exercise per Week:     Minutes of Exercise per Session:    Stress:     Feeling of Stress :    Social Connections:     Frequency of Communication with Friends and Family:     Frequency of Social Gatherings with Friends and Family:     Attends Nondenominational Services:     Active Member of Clubs or Organizations:     Attends Club or Organization Meetings:     Marital Status:    Intimate Partner Violence:     Fear of Current or Ex-Partner:     Emotionally Abused:     Physically Abused:     Sexually Abused:            ROS:  [x] All negative/unchanged except if checked.  Explain positive(checked items) below:  [] Constitutional  [] Eyes  [] Ear/Nose/Mouth/Throat  [] Respiratory  [] CV  [] GI  []   [] Musculoskeletal  [] Skin/Breast  [] Neurological  [] Endocrine  [] Heme/Lymph  [] Allergic/Immunologic    Explanation:     MEDICATIONS:    Current Facility-Administered Medications:     diazePAM (VALIUM) tablet 5 mg, 5 mg, Oral, M7U PRN, CALEB Lynn CNP    OLANZapine (ZYPREXA) tablet 10 mg, 10 mg, Oral, Nightly, CALEB Bonilla CNP    nicotine polacrilex (NICORETTE) gum 4 mg, 4 mg, Oral, PRN, CALEB Lynn CNP, 4 mg at 08/26/21 1249    divalproex (DEPAKOTE) DR tablet 250 mg, 250 mg, Oral, 2 times per day, CALEB Huertas CNP, 985 mg at 08/26/21 1109    acetaminophen (TYLENOL) tablet 650 mg, 650 mg, Oral, Once, Mohsen Sharma MD    acetaminophen (TYLENOL) tablet 650 mg, 650 mg, Oral, Q6H PRN, Luz Nick MD    magnesium hydroxide (MILK OF MAGNESIA) 400 MG/5ML suspension 30 mL, 30 mL, Oral, Daily PRN, Luz Nick MD    aluminum & magnesium hydroxide-simethicone (MAALOX) 139-660-13 MG/5ML suspension 30 mL, 30 mL, Oral, PRN, Vangie Fuentes MD    hydrOXYzine (VISTARIL) capsule 50 mg, 50 mg, Oral, TID PRN, Vangie Fuentes MD, 50 mg at 08/25/21 2039    haloperidol (HALDOL) tablet 5 mg, 5 mg, Oral, Q6H PRN **OR** haloperidol lactate (HALDOL) injection 5 mg, 5 mg, IntraMUSCular, Q6H PRN, Vangie Fuentes MD    traZODone (DESYREL) tablet 50 mg, 50 mg, Oral, Nightly PRN, Vangie Fuentes MD, 50 mg at 08/25/21 2039      Examination:  /68   Pulse 61   Temp 98.9 °F (37.2 °C) (Temporal)   Resp 14   Ht 6' (1.829 m)   Wt 200 lb (90.7 kg)   SpO2 98%   BMI 27.12 kg/m²   Gait - steady  Medication side effects(SE): Denies    Mental Status Examination:    Level of consciousness:  within normal limits   Appearance:  fair grooming and fair hygiene  Behavior/Motor:  no abnormalities noted  Attitude toward examiner:  cooperative  Speech:  spontaneous, normal rate and normal volume   Mood: \" I am okay. \"  Affect: Flat blunted  Thought processes: Illogical  Thought content: Endorses paranoia denies SI/HI intent or plan denies auditory visual hallucinations  Cognition:  oriented to person, place, and time   Concentration distractible  Insight poor   Judgement poor     ASSESSMENT:   Patient symptoms are:  [] Well controlled  [] Improving  [] Worsening  [x] No change      Diagnosis:   Principal Problem:    Bipolar affective disorder, depressed, severe, with psychotic behavior ([facility] [address]  Resolved Problems:    * No resolved hospital problems.  *      LABS:    Recent Labs     08/24/21  1632   WBC 9.3   HGB 15.6        Recent Labs     08/24/21  1632      K 4.2      CO2 28   BUN 10   CREATININE 0.9   GLUCOSE 98     Recent Labs     08/24/21  1632   BILITOT 0.3   ALKPHOS 99   AST 18   ALT 32     Lab Results   Component Value Date    LABAMPH NOT DETECTED 08/24/2021    BARBSCNU NOT DETECTED 08/24/2021    LABBENZ NOT DETECTED 08/24/2021    LABMETH NOT DETECTED 08/24/2021    OPIATESCREENURINE NOT DETECTED 08/24/2021    PHENCYCLIDINESCREENURINE NOT DETECTED 08/24/2021    ETOH <10 08/24/2021     No results found for: TSH, FREET4  No results found for: LITHIUM  No results found for: VALPROATE, CBMZ        Treatment Plan:  Reviewed current Medications with the patient. Risks, benefits, side effects, drug-to-drug interactions and alternatives to treatment were discussed. Collateral information:   CD evaluation  Encourage patient to attend group and other milieu activities.   Discharge planning discussed with the patient and treatment team.    Increase Zyprexa to 10 at bedtime for psychosis  Continue Depakote 250 mg twice daily for mood    PSYCHOTHERAPY/COUNSELING:  [x] Therapeutic interview  [x] Supportive  [] CBT  [] Ongoing  [] Other    [x] Patient continues to need, on a daily basis, active treatment furnished directly by or requiring the supervision of inpatient psychiatric personnel      Anticipated Length of stay: 3 to 7 days based on stability            Electronically signed by CALEB Cooper CNP on 4/98/4299 at 1:40 PM

## 2021-08-26 NOTE — CARE COORDINATION
Spoke with pt wife Larry Caballero. Larry Caballero has not removed the guns yet, reports they will be removed before pt is discharged. She has spoken with pt since he has been here, reporting he didn't say much, did not voice any delusions or paranoia. Ricardokelvin Caballero would like pt to be referred to rehab, informed Ricardokelvin Caballero pt would have to be agreeable for a referral to be made. Larry Caballero is concerned with pt not taking his meds when he is discharged.

## 2021-08-27 PROCEDURE — 6370000000 HC RX 637 (ALT 250 FOR IP): Performed by: NURSE PRACTITIONER

## 2021-08-27 PROCEDURE — 1240000000 HC EMOTIONAL WELLNESS R&B

## 2021-08-27 PROCEDURE — 6370000000 HC RX 637 (ALT 250 FOR IP): Performed by: PSYCHIATRY & NEUROLOGY

## 2021-08-27 PROCEDURE — 99232 SBSQ HOSP IP/OBS MODERATE 35: CPT | Performed by: NURSE PRACTITIONER

## 2021-08-27 RX ORDER — DIVALPROEX SODIUM 250 MG/1
500 TABLET, DELAYED RELEASE ORAL EVERY 12 HOURS SCHEDULED
Status: DISCONTINUED | OUTPATIENT
Start: 2021-08-27 | End: 2021-09-02 | Stop reason: HOSPADM

## 2021-08-27 RX ORDER — SULFAMETHOXAZOLE AND TRIMETHOPRIM 800; 160 MG/1; MG/1
1 TABLET ORAL EVERY 12 HOURS SCHEDULED
Status: DISCONTINUED | OUTPATIENT
Start: 2021-08-27 | End: 2021-09-02 | Stop reason: HOSPADM

## 2021-08-27 RX ORDER — SULFAMETHOXAZOLE AND TRIMETHOPRIM 800; 160 MG/1; MG/1
1 TABLET ORAL 2 TIMES DAILY
COMMUNITY
Start: 2021-08-27 | End: 2021-09-06

## 2021-08-27 RX ADMIN — ACETAMINOPHEN 650 MG: 325 TABLET ORAL at 15:15

## 2021-08-27 RX ADMIN — DIVALPROEX SODIUM 250 MG: 250 TABLET, DELAYED RELEASE ORAL at 08:22

## 2021-08-27 RX ADMIN — SULFAMETHOXAZOLE AND TRIMETHOPRIM 1 TABLET: 800; 160 TABLET ORAL at 21:46

## 2021-08-27 RX ADMIN — HYDROXYZINE PAMOATE 50 MG: 50 CAPSULE ORAL at 21:47

## 2021-08-27 RX ADMIN — NICOTINE POLACRILEX 4 MG: 2 GUM, CHEWING BUCCAL at 12:54

## 2021-08-27 RX ADMIN — OLANZAPINE 10 MG: 10 TABLET, FILM COATED ORAL at 21:47

## 2021-08-27 RX ADMIN — DIAZEPAM 5 MG: 5 TABLET ORAL at 22:37

## 2021-08-27 RX ADMIN — DIAZEPAM 5 MG: 5 TABLET ORAL at 08:39

## 2021-08-27 RX ADMIN — DIVALPROEX SODIUM 500 MG: 250 TABLET, DELAYED RELEASE ORAL at 21:47

## 2021-08-27 RX ADMIN — NICOTINE POLACRILEX 4 MG: 2 GUM, CHEWING BUCCAL at 18:03

## 2021-08-27 RX ADMIN — NICOTINE POLACRILEX 4 MG: 2 GUM, CHEWING BUCCAL at 08:39

## 2021-08-27 ASSESSMENT — PAIN SCALES - GENERAL
PAINLEVEL_OUTOF10: 0
PAINLEVEL_OUTOF10: 4
PAINLEVEL_OUTOF10: 0
PAINLEVEL_OUTOF10: 0

## 2021-08-27 NOTE — PLAN OF CARE
[facility]  Day 3 Interdisciplinary Treatment Plan NOTE    Review Date & Time: 8/27/2021    10:07 AM      Patient was in treatment team    Estimated Length of Stay Update:   5 to 7 days  Estimated Discharge Date Update:  8/31/21    EDUCATION:   Learner Progress Toward Treatment Goals: Reviewed results and recommendations of this team    Method: Group    Outcome: Verbalized understanding    PATIENT GOALS:  'get up and move\"    PLAN/TREATMENT RECOMMENDATIONS UPDATE: encourage daily goals and groups    GOALS UPDATE:   Time frame for Short-Term Goals:  By discharge      Gauri Marks RN

## 2021-08-27 NOTE — CARE COORDINATION
Peer Recovery Support Note    Name: Danielle Casillas  Date: 8/27/2021    Chief Complaint   Patient presents with    Suicidal     +SI/-HI, plan to kill self with a gun       Peer Support met with patient. [x] Support and education provided  [] Resources provided   [] Treatment referral: Zarina Hidden  [] Other:   [] Patient declined peer recovery services     Referred By:Neva(JENI)    Notes: Patient was very open about going to treatment.   Signed: Sissy Collazo, 8/27/2021

## 2021-08-27 NOTE — PROGRESS NOTES
BEHAVIORAL HEALTH FOLLOW-UP NOTE     8/27/2021     Patient was seen and examined in person, Chart reviewed   Patient's case discussed with staff/team    Chief Complaint: Paranoid    Interim History: Patient seen during treatment team he seems slightly more relaxed however he continues to have paranoia believing that people are out to get him. He states he still feels \"depressed. \"  However his affect is slightly brighter more animated    Appetite:   [x] Normal/Unchanged  [] Increased  [] Decreased      Sleep:       [x] Normal/Unchanged  [] Fair       [] Poor              Energy:    [x] Normal/Unchanged  [] Increased  [] Decreased        SI [] Present  [x] Absent    HI  []Present  [x] Absent     Aggression:  [] yes  [x] no    Patient is [x] able  [] unable to CONTRACT FOR SAFETY     PAST MEDICAL/PSYCHIATRIC HISTORY:   Past Medical History:   Diagnosis Date    Hypertension        FAMILY/SOCIAL HISTORY:  Family History   Problem Relation Age of Onset    Hypertension Father     COPD Father      Social History     Socioeconomic History    Marital status:      Spouse name: Not on file    Number of children: Not on file    Years of education: Not on file    Highest education level: Not on file   Occupational History    Not on file   Tobacco Use    Smoking status: Current Every Day Smoker     Packs/day: 1.50     Years: 20.00     Pack years: 30.00     Types: Cigarettes    Smokeless tobacco: Former User   Substance and Sexual Activity    Alcohol use: Yes     Comment: occ    Drug use: Not on file    Sexual activity: Not on file   Other Topics Concern    Not on file   Social History Narrative    Not on file     Social Determinants of Health     Financial Resource Strain:     Difficulty of Paying Living Expenses:    Food Insecurity:     Worried About Running Out of Food in the Last Year:     Ran Out of Food in the Last Year:    Transportation Needs:     Lack of Transportation (Medical):      Lack of Transportation (Non-Medical):    Physical Activity:     Days of Exercise per Week:     Minutes of Exercise per Session:    Stress:     Feeling of Stress :    Social Connections:     Frequency of Communication with Friends and Family:     Frequency of Social Gatherings with Friends and Family:     Attends Restorationism Services:     Active Member of Clubs or Organizations:     Attends Club or Organization Meetings:     Marital Status:    Intimate Partner Violence:     Fear of Current or Ex-Partner:     Emotionally Abused:     Physically Abused:     Sexually Abused:            ROS:  [x] All negative/unchanged except if checked.  Explain positive(checked items) below:  [] Constitutional  [] Eyes  [] Ear/Nose/Mouth/Throat  [] Respiratory  [] CV  [] GI  []   [] Musculoskeletal  [] Skin/Breast  [] Neurological  [] Endocrine  [] Heme/Lymph  [] Allergic/Immunologic    Explanation:     MEDICATIONS:    Current Facility-Administered Medications:     diazePAM (VALIUM) tablet 5 mg, 5 mg, Oral, Z3K PRN, CALEB Avitia - CNP, 5 mg at 08/27/21 0839    OLANZapine (ZYPREXA) tablet 10 mg, 10 mg, Oral, Nightly, CALEB Bonilla - CNP, 10 mg at 08/26/21 2058    nicotine polacrilex (NICORETTE) gum 4 mg, 4 mg, Oral, PRN, CALEB Avitia - CNP, 4 mg at 08/27/21 0839    divalproex (DEPAKOTE) DR tablet 250 mg, 250 mg, Oral, 2 times per day, CALEB Melendez - CNP, 918 mg at 08/27/21 0403    acetaminophen (TYLENOL) tablet 650 mg, 650 mg, Oral, Once, Lindsey Chowdary MD    acetaminophen (TYLENOL) tablet 650 mg, 650 mg, Oral, Q6H PRN, Luis Miguel Hernandez MD, 650 mg at 08/26/21 2223    magnesium hydroxide (MILK OF MAGNESIA) 400 MG/5ML suspension 30 mL, 30 mL, Oral, Daily PRN, Luis Miguel Hernandez MD    aluminum & magnesium hydroxide-simethicone (MAALOX) 200-200-20 MG/5ML suspension 30 mL, 30 mL, Oral, PRN, Luis Miguel Hernandez MD    hydrOXYzine (VISTARIL) capsule 50 mg, 50 mg, Oral, TID PRN, Luis Miguel Hernandez MD, 50 mg at 08/26/21 2058    haloperidol (HALDOL) tablet 5 mg, 5 mg, Oral, Q6H PRN **OR** haloperidol lactate (HALDOL) injection 5 mg, 5 mg, IntraMUSCular, Q6H PRN, Ivone Cam MD    traZODone (DESYREL) tablet 50 mg, 50 mg, Oral, Nightly PRN, Ivone Cam MD, 50 mg at 08/26/21 2059      Examination:  /89   Pulse 69   Temp 98 °F (36.7 °C) (Temporal)   Resp 16   Ht 6' (1.829 m)   Wt 200 lb (90.7 kg)   SpO2 98%   BMI 27.12 kg/m²   Gait - steady  Medication side effects(SE): Denies    Mental Status Examination:    Level of consciousness:  within normal limits   Appearance:  fair grooming and fair hygiene  Behavior/Motor:  no abnormalities noted  Attitude toward examiner:  cooperative  Speech:  spontaneous, normal rate and normal volume   Mood: \" I am okay. \"  Affect: Flat blunted  Thought processes: Illogical  Thought content: Endorses paranoia denies SI/HI intent or plan denies auditory visual hallucinations  Cognition:  oriented to person, place, and time   Concentration distractible  Insight poor   Judgement poor     ASSESSMENT:   Patient symptoms are:  [] Well controlled  [] Improving  [] Worsening  [x] No change      Diagnosis:   Principal Problem:    Bipolar affective disorder, depressed, severe, with psychotic behavior ([facility] [address]  Resolved Problems:    * No resolved hospital problems.  *      LABS:    Recent Labs     08/24/21  1632   WBC 9.3   HGB 15.6        Recent Labs     08/24/21  1632      K 4.2      CO2 28   BUN 10   CREATININE 0.9   GLUCOSE 98     Recent Labs     08/24/21  1632   BILITOT 0.3   ALKPHOS 99   AST 18   ALT 32     Lab Results   Component Value Date    LABAMPH NOT DETECTED 08/24/2021    BARBSCNU NOT DETECTED 08/24/2021    LABBENZ NOT DETECTED 08/24/2021    LABMETH NOT DETECTED 08/24/2021    OPIATESCREENURINE NOT DETECTED 08/24/2021    PHENCYCLIDINESCREENURINE NOT DETECTED 08/24/2021    ETOH <10 08/24/2021     No results found for: TSH, FREET4  No results found for: LITHIUM  No results found for: VALPROATE, CBMZ        Treatment Plan:  Reviewed current Medications with the patient. Risks, benefits, side effects, drug-to-drug interactions and alternatives to treatment were discussed. Collateral information:   CD evaluation  Encourage patient to attend group and other milieu activities.   Discharge planning discussed with the patient and treatment team.    Continue Zyprexa to 10 at bedtime for psychosis  Increase Depakote 500 mg twice daily for mood    PSYCHOTHERAPY/COUNSELING:  [x] Therapeutic interview  [x] Supportive  [] CBT  [] Ongoing  [] Other    [x] Patient continues to need, on a daily basis, active treatment furnished directly by or requiring the supervision of inpatient psychiatric personnel      Anticipated Length of stay: 3 to 7 days based on stability            Electronically signed by CALEB Huertas CNP on 2/70/5105 at 10:41 AM

## 2021-08-27 NOTE — PLAN OF CARE
Problem: Altered Mood, Depressive Behavior:  Goal: Able to verbalize and/or display a decrease in depressive symptoms  Description: Able to verbalize and/or display a decrease in depressive symptoms  Outcome: Ongoing     Problem: Altered Mood, Psychotic Behavior:  Goal: Able to verbalize reality based thinking  Description: Able to verbalize reality based thinking  Outcome: Ongoing         Patient flat and depressed. Patient still believes someone one out to get him. Patient complaints of cold sweats this morning relieved with librium. Wants inpatient rehab. Denies suicidal and homicidal thoughts. Denies hallucinations.

## 2021-08-27 NOTE — PLAN OF CARE
Problem: Altered Mood, Depressive Behavior:  Goal: Able to verbalize and/or display a decrease in depressive symptoms  Description: Able to verbalize and/or display a decrease in depressive symptoms  Outcome: Ongoing     Problem: Altered Mood, Psychotic Behavior:  Goal: Able to verbalize reality based thinking  Description: Able to verbalize reality based thinking  8/26/2021 2048 by Su George RN  Outcome: Ongoing     Patient has been out on the unit watching tv. Social with select peers. Calm and cooperative during conversation. Affect is flat and mood is depressed. Patient reports that he wants to go to rehab upon discharge. Denies suicidal/homicidal ideations and hallucinations at this time. Purposeful rounding continued.

## 2021-08-27 NOTE — GROUP NOTE
Group Therapy Note    Date: 8/27/2021    Group Start Time: 1000  Group End Time: 4832  Group Topic: Psychoeducation    [facility]    Amna Cantu, CTRS        Group Therapy Note      Number of participants: 12  Type of group: Psychoeducation  Mode of intervention: Education, Support, Socialization, Exploration, Clarifying, and Problem-solving  Topic: Overcoming Avoidance: FACE  Objective: Pt will find, action, coping, and evaluate (FACE) situations in recovery they want to overcome. Patient's Goal:  \"Get up and move\"     Notes:  Pt was interactive during group ways to [facility] situations in recovery and how to overcome certain thoughts and emotions. Pt gave support and feedback to others. Status After Intervention:  Improved    Participation Level:  Active Listener and Interactive    Participation Quality: Appropriate, Attentive, Sharing and Supportive      Speech:  normal      Thought Process/Content: Logical      Affective Functioning: Congruent      Mood: euthymic      Level of consciousness:  Alert, Oriented x4 and Attentive      Response to Learning: Able to verbalize current knowledge/experience, Able to verbalize/acknowledge new learning, Able to retain information, Capable of insight, Able to change behavior and Progressing to goal      Endings: None Reported    Modes of Intervention: Education, Support, Socialization, Exploration, Clarifying, Problem-solving and Activity

## 2021-08-27 NOTE — CARE COORDINATION
Pt seen during treatment team. Pt requesting for a referral to be made to [facility]. A referral will be made closer to pt discharge date. Referral was made to [facility] to come see pt.

## 2021-08-27 NOTE — PLAN OF CARE
Out in main lounge watching TV. Denies suicidal thoughts or intent to harm himself or others. Denies hallucinations. Offers very little conversation. Does voice some paranoid thoughts.

## 2021-08-27 NOTE — PROGRESS NOTES
Pt participated in leisure group of pts choice. Group consensus was to watch a movie. Pt was 1 out of 5 in attendance.

## 2021-08-28 PROCEDURE — 6370000000 HC RX 637 (ALT 250 FOR IP): Performed by: NURSE PRACTITIONER

## 2021-08-28 PROCEDURE — 1240000000 HC EMOTIONAL WELLNESS R&B

## 2021-08-28 PROCEDURE — 99231 SBSQ HOSP IP/OBS SF/LOW 25: CPT | Performed by: NURSE PRACTITIONER

## 2021-08-28 PROCEDURE — 6370000000 HC RX 637 (ALT 250 FOR IP): Performed by: PSYCHIATRY & NEUROLOGY

## 2021-08-28 RX ORDER — DIAZEPAM 5 MG/1
5 TABLET ORAL EVERY 8 HOURS PRN
Status: ACTIVE | OUTPATIENT
Start: 2021-08-28 | End: 2021-08-30

## 2021-08-28 RX ADMIN — OLANZAPINE 15 MG: 5 TABLET, FILM COATED ORAL at 20:48

## 2021-08-28 RX ADMIN — DIAZEPAM 5 MG: 5 TABLET ORAL at 09:59

## 2021-08-28 RX ADMIN — DIVALPROEX SODIUM 500 MG: 250 TABLET, DELAYED RELEASE ORAL at 09:26

## 2021-08-28 RX ADMIN — SULFAMETHOXAZOLE AND TRIMETHOPRIM 1 TABLET: 800; 160 TABLET ORAL at 20:48

## 2021-08-28 RX ADMIN — TRAZODONE HYDROCHLORIDE 50 MG: 50 TABLET ORAL at 20:48

## 2021-08-28 RX ADMIN — DIVALPROEX SODIUM 500 MG: 250 TABLET, DELAYED RELEASE ORAL at 20:48

## 2021-08-28 RX ADMIN — HYDROXYZINE PAMOATE 50 MG: 50 CAPSULE ORAL at 20:48

## 2021-08-28 RX ADMIN — NICOTINE POLACRILEX 4 MG: 2 GUM, CHEWING BUCCAL at 17:10

## 2021-08-28 RX ADMIN — SULFAMETHOXAZOLE AND TRIMETHOPRIM 1 TABLET: 800; 160 TABLET ORAL at 09:26

## 2021-08-28 ASSESSMENT — PAIN SCALES - GENERAL
PAINLEVEL_OUTOF10: 0
PAINLEVEL_OUTOF10: 0

## 2021-08-28 NOTE — PROGRESS NOTES
BEHAVIORAL HEALTH FOLLOW-UP NOTE     8/28/2021     Patient was seen and examined in person, Chart reviewed   Patient's case discussed with staff/team    Chief Complaint: Paranoid    Interim History: Patient seen in his room this morning he seems slightly more relaxed however he continues to have paranoia believing that people are out to get him. He states he still feels \"depressed. \"  However his affect is slightly brighter more animated. We will decrease the Valium to every 8 hours as needed and DC after 2 days. Will increase the Zyprexa at at bedtime to 15 mg due to continued symptoms of paranoia and delusional thought.     Appetite:   [x] Normal/Unchanged  [] Increased  [] Decreased      Sleep:       [x] Normal/Unchanged  [] Fair       [] Poor              Energy:    [x] Normal/Unchanged  [] Increased  [] Decreased        SI [] Present  [x] Absent    HI  []Present  [x] Absent     Aggression:  [] yes  [x] no    Patient is [x] able  [] unable to CONTRACT FOR SAFETY     PAST MEDICAL/PSYCHIATRIC HISTORY:   Past Medical History:   Diagnosis Date    Hypertension        FAMILY/SOCIAL HISTORY:  Family History   Problem Relation Age of Onset    Hypertension Father     COPD Father      Social History     Socioeconomic History    Marital status:      Spouse name: Not on file    Number of children: Not on file    Years of education: Not on file    Highest education level: Not on file   Occupational History    Not on file   Tobacco Use    Smoking status: Current Every Day Smoker     Packs/day: 1.50     Years: 20.00     Pack years: 30.00     Types: Cigarettes    Smokeless tobacco: Former User   Substance and Sexual Activity    Alcohol use: Yes     Comment: occ    Drug use: Not on file    Sexual activity: Not on file   Other Topics Concern    Not on file   Social History Narrative    Not on file     Social Determinants of Health     Financial Resource Strain:     Difficulty of Paying Living Expenses: Food Insecurity:     Worried About Running Out of Food in the Last Year:     [address] in the Last Year:    Transportation Needs:     Lack of Transportation (Medical):  Lack of Transportation (Non-Medical):    Physical Activity:     Days of Exercise per Week:     Minutes of Exercise per Session:    Stress:     Feeling of Stress :    Social Connections:     Frequency of Communication with Friends and Family:     Frequency of Social Gatherings with Friends and Family:     Attends [employer] Services:     Active Member of Clubs or Organizations:     Attends Club or Organization Meetings:     Marital Status:    Intimate Partner Violence:     Fear of Current or Ex-Partner:     Emotionally Abused:     Physically Abused:     Sexually Abused:            ROS:  [x] All negative/unchanged except if checked.  Explain positive(checked items) below:  [] Constitutional  [] Eyes  [] Ear/Nose/Mouth/Throat  [] Respiratory  [] CV  [] GI  []   [] Musculoskeletal  [] Skin/Breast  [] Neurological  [] Endocrine  [] Heme/Lymph  [] Allergic/Immunologic    Explanation:     MEDICATIONS:    Current Facility-Administered Medications:     diazePAM (VALIUM) tablet 5 mg, 5 mg, Oral, Q8H PRN, CALEB Aguilar CNP    divalproex (DEPAKOTE) DR tablet 500 mg, 500 mg, Oral, 2 times per day, Gleda Kanner, APRN - CNP, 200 mg at 08/28/21 8706    sulfamethoxazole-trimethoprim (BACTRIM DS;SEPTRA DS) 800-160 MG per tablet 1 tablet, 1 tablet, Oral, 2 times per day, CALEB Aguilar CNP, 1 tablet at 08/28/21 0926    OLANZapine (ZYPREXA) tablet 10 mg, 10 mg, Oral, Nightly, CALEB Starr CNP, 10 mg at 08/27/21 2147    nicotine polacrilex (NICORETTE) gum 4 mg, 4 mg, Oral, PRN, Gleda Kanner, APRN - CNP, 4 mg at 08/27/21 1803    acetaminophen (TYLENOL) tablet 650 mg, 650 mg, Oral, Once, Fany Rothman MD    acetaminophen (TYLENOL) tablet 650 mg, 650 mg, Oral, Q6H PRN, Sylvie Finley MD, 650 mg at 08/27/21 1515    magnesium hydroxide (MILK OF MAGNESIA) 400 MG/5ML suspension 30 mL, 30 mL, Oral, Daily PRN, Jose Bryant MD    aluminum & magnesium hydroxide-simethicone (MAALOX) 200-200-20 MG/5ML suspension 30 mL, 30 mL, Oral, PRN, Jose Bryant MD    hydrOXYzine (VISTARIL) capsule 50 mg, 50 mg, Oral, TID PRN, Jose Bryant MD, 50 mg at 08/27/21 2147    haloperidol (HALDOL) tablet 5 mg, 5 mg, Oral, Q6H PRN **OR** haloperidol lactate (HALDOL) injection 5 mg, 5 mg, IntraMUSCular, Q6H PRN, Jose Bryant MD    traZODone (DESYREL) tablet 50 mg, 50 mg, Oral, Nightly PRN, Jose Bryant MD, 50 mg at 08/26/21 2059      Examination:  /65   Pulse 62   Temp 98.7 °F (37.1 °C)   Resp 15   Ht 6' (1.829 m)   Wt 200 lb (90.7 kg)   SpO2 98%   BMI 27.12 kg/m²   Gait - steady  Medication side effects(SE): Denies    Mental Status Examination:    Level of consciousness:  within normal limits   Appearance:  fair grooming and fair hygiene  Behavior/Motor:  no abnormalities noted  Attitude toward examiner:  cooperative  Speech:  spontaneous, normal rate and normal volume   Mood: \" I am okay. \"  Affect: Flat blunted  Thought processes: Illogical  Thought content: Endorses paranoia denies SI/HI intent or plan denies auditory visual hallucinations  Cognition:  oriented to person, place, and time   Concentration distractible  Insight poor   Judgement poor     ASSESSMENT:   Patient symptoms are:  [] Well controlled  [] Improving  [] Worsening  [x] No change      Diagnosis:   Principal Problem:    Bipolar affective disorder, depressed, severe, with psychotic behavior ([facility] [address]  Resolved Problems:    * No resolved hospital problems. *      LABS:    No results for input(s): WBC, HGB, PLT in the last 72 hours. No results for input(s): NA, K, CL, CO2, BUN, CREATININE, GLUCOSE in the last 72 hours. No results for input(s): BILITOT, ALKPHOS, AST, ALT in the last 72 hours.   Lab Results   Component Value Date [address] NOT DETECTED 08/24/2021    BARBSCNU NOT DETECTED 08/24/2021    LABBENZ NOT DETECTED 08/24/2021    LABMETH NOT DETECTED 08/24/2021    OPIATESCREENURINE NOT DETECTED 08/24/2021    PHENCYCLIDINESCREENURINE NOT DETECTED 08/24/2021    ETOH <10 08/24/2021     No results found for: TSH, FREET4  No results found for: LITHIUM  No results found for: VALPROATE, CBMZ        Treatment Plan:  Reviewed current Medications with the patient. Risks, benefits, side effects, drug-to-drug interactions and alternatives to treatment were discussed. Collateral information:   CD evaluation  Encourage patient to attend group and other milieu activities. Discharge planning discussed with the patient and treatment team.    Continue Zyprexa to 10 at bedtime for psychosis  Increase Depakote 500 mg twice daily for mood    PSYCHOTHERAPY/COUNSELING:  [x] Therapeutic interview  [x] Supportive  [] CBT  [] Ongoing  [] Other    [x] Patient continues to need, on a daily basis, active treatment furnished directly by or requiring the supervision of inpatient psychiatric personnel      Anticipated Length of stay: 3 to 7 days based on stability       NOTE: This report was transcribed using voice recognition software. Every effort was made to ensure accuracy; however, inadvertent computerized transcription errors may be present.     Electronically signed by CALEB Badillo CNP on 8/28/2021 at 1:07 PM

## 2021-08-29 PROCEDURE — 6370000000 HC RX 637 (ALT 250 FOR IP): Performed by: PSYCHIATRY & NEUROLOGY

## 2021-08-29 PROCEDURE — 99231 SBSQ HOSP IP/OBS SF/LOW 25: CPT | Performed by: NURSE PRACTITIONER

## 2021-08-29 PROCEDURE — 6370000000 HC RX 637 (ALT 250 FOR IP): Performed by: NURSE PRACTITIONER

## 2021-08-29 PROCEDURE — 1240000000 HC EMOTIONAL WELLNESS R&B

## 2021-08-29 RX ADMIN — OLANZAPINE 15 MG: 5 TABLET, FILM COATED ORAL at 20:38

## 2021-08-29 RX ADMIN — DIVALPROEX SODIUM 500 MG: 250 TABLET, DELAYED RELEASE ORAL at 08:40

## 2021-08-29 RX ADMIN — SULFAMETHOXAZOLE AND TRIMETHOPRIM 1 TABLET: 800; 160 TABLET ORAL at 20:38

## 2021-08-29 RX ADMIN — NICOTINE POLACRILEX 4 MG: 2 GUM, CHEWING BUCCAL at 16:49

## 2021-08-29 RX ADMIN — SULFAMETHOXAZOLE AND TRIMETHOPRIM 1 TABLET: 800; 160 TABLET ORAL at 08:40

## 2021-08-29 RX ADMIN — DIVALPROEX SODIUM 500 MG: 250 TABLET, DELAYED RELEASE ORAL at 20:38

## 2021-08-29 RX ADMIN — TRAZODONE HYDROCHLORIDE 50 MG: 50 TABLET ORAL at 20:38

## 2021-08-29 RX ADMIN — NICOTINE POLACRILEX 4 MG: 2 GUM, CHEWING BUCCAL at 12:32

## 2021-08-29 RX ADMIN — NICOTINE POLACRILEX 4 MG: 2 GUM, CHEWING BUCCAL at 21:46

## 2021-08-29 RX ADMIN — HYDROXYZINE PAMOATE 50 MG: 50 CAPSULE ORAL at 20:38

## 2021-08-29 RX ADMIN — NICOTINE POLACRILEX 4 MG: 2 GUM, CHEWING BUCCAL at 08:41

## 2021-08-29 RX ADMIN — HYDROXYZINE PAMOATE 50 MG: 50 CAPSULE ORAL at 08:40

## 2021-08-29 ASSESSMENT — PAIN SCALES - GENERAL
PAINLEVEL_OUTOF10: 0
PAINLEVEL_OUTOF10: 0

## 2021-08-29 NOTE — PROGRESS NOTES
Patient was at desk asking for his Valium educated that it is for with drawal and when I did his assessment that he exhibited a CIWA of 1 which does not warrant withdrawal to medicate with Valium currently. Educated that the Valium is only as needed. Will continue to monitor. Patient verbalizes understanding.

## 2021-08-29 NOTE — PROGRESS NOTES
BEHAVIORAL HEALTH FOLLOW-UP NOTE     8/29/2021     Patient was seen and examined in person, Chart reviewed   Patient's case discussed with staff/team    Chief Complaint: \"I am doing pretty good. \"    Interim History: Patient seen in his room this morning he seems slightly more relaxed however he denies paranoia today. He states he still feels \"depressed. \"  However his affect is slightly brighter more animated. He states that he is worried about his discharge and what will happen after discharge. Will increase the Zyprexa at at bedtime to 15 mg due to continued symptoms of paranoia and delusional thought.     Appetite:   [x] Normal/Unchanged  [] Increased  [] Decreased      Sleep:       [x] Normal/Unchanged  [] Fair       [] Poor              Energy:    [x] Normal/Unchanged  [] Increased  [] Decreased        SI [] Present  [x] Absent    HI  []Present  [x] Absent     Aggression:  [] yes  [x] no    Patient is [x] able  [] unable to CONTRACT FOR SAFETY     PAST MEDICAL/PSYCHIATRIC HISTORY:   Past Medical History:   Diagnosis Date    Hypertension        FAMILY/SOCIAL HISTORY:  Family History   Problem Relation Age of Onset    Hypertension Father     COPD Father      Social History     Socioeconomic History    Marital status:      Spouse name: Not on file    Number of children: Not on file    Years of education: Not on file    Highest education level: Not on file   Occupational History    Not on file   Tobacco Use    Smoking status: Current Every Day Smoker     Packs/day: 1.50     Years: 20.00     Pack years: 30.00     Types: Cigarettes    Smokeless tobacco: Former User   Substance and Sexual Activity    Alcohol use: Yes     Comment: occ    Drug use: Not on file    Sexual activity: Not on file   Other Topics Concern    Not on file   Social History Narrative    Not on file     Social Determinants of Health     Financial Resource Strain:     Difficulty of Paying Living Expenses:    Food Insecurity:  Worried About [facility] in the Last Year:    [address] in the Last Year:    Transportation Needs:     Lack of Transportation (Medical):  Lack of Transportation (Non-Medical):    Physical Activity:     Days of Exercise per Week:     Minutes of Exercise per Session:    Stress:     Feeling of Stress :    Social Connections:     Frequency of Communication with Friends and Family:     Frequency of Social Gatherings with Friends and Family:     Attends [employer] Services:     Active Member of Clubs or Organizations:     Attends Club or Organization Meetings:     Marital Status:    Intimate Partner Violence:     Fear of Current or Ex-Partner:     Emotionally Abused:     Physically Abused:     Sexually Abused:            ROS:  [x] All negative/unchanged except if checked.  Explain positive(checked items) below:  [] Constitutional  [] Eyes  [] Ear/Nose/Mouth/Throat  [] Respiratory  [] CV  [] GI  []   [] Musculoskeletal  [] Skin/Breast  [] Neurological  [] Endocrine  [] Heme/Lymph  [] Allergic/Immunologic    Explanation:     MEDICATIONS:    Current Facility-Administered Medications:     diazePAM (VALIUM) tablet 5 mg, 5 mg, Oral, Q8H PRN, CALEB Aguilar CNP    OLANZapine (ZYPREXA) tablet 15 mg, 15 mg, Oral, Nightly, CALEB Aguilar CNP, 15 mg at 08/28/21 2048    divalproex (DEPAKOTE) DR tablet 500 mg, 500 mg, Oral, 2 times per day, Gleda Kanner, APRN - CNP, 391 mg at 08/29/21 0840    sulfamethoxazole-trimethoprim (BACTRIM DS;SEPTRA DS) 800-160 MG per tablet 1 tablet, 1 tablet, Oral, 2 times per day, CALEB Aguilar CNP, 1 tablet at 08/29/21 0840    nicotine polacrilex (NICORETTE) gum 4 mg, 4 mg, Oral, PRN, Gleda Kanner, APRN - CNP, 4 mg at 08/29/21 0841    acetaminophen (TYLENOL) tablet 650 mg, 650 mg, Oral, Once, Fany Rothman MD    acetaminophen (TYLENOL) tablet 650 mg, 650 mg, Oral, Q6H PRN, Sylvie Finley MD, 650 mg at 08/27/21 1515    magnesium hydroxide (MILK OF MAGNESIA) 400 MG/5ML suspension 30 mL, 30 mL, Oral, Daily PRN, Keisha Mchugh MD    aluminum & magnesium hydroxide-simethicone (MAALOX) 200-200-20 MG/5ML suspension 30 mL, 30 mL, Oral, PRN, Keisha Mchugh MD    hydrOXYzine (VISTARIL) capsule 50 mg, 50 mg, Oral, TID PRN, Keisha Mchugh MD, 50 mg at 08/29/21 0840    haloperidol (HALDOL) tablet 5 mg, 5 mg, Oral, Q6H PRN **OR** haloperidol lactate (HALDOL) injection 5 mg, 5 mg, IntraMUSCular, Q6H PRN, Keisha Mchugh MD    traZODone (DESYREL) tablet 50 mg, 50 mg, Oral, Nightly PRN, Keisha Mchugh MD, 50 mg at 08/28/21 2048      Examination:  /63   Pulse 56   Temp 97.7 °F (36.5 °C)   Resp 15   Ht 6' (1.829 m)   Wt 200 lb (90.7 kg)   SpO2 98%   BMI 27.12 kg/m²   Gait - steady  Medication side effects(SE): Denies    Mental Status Examination:    Level of consciousness:  within normal limits   Appearance:  fair grooming and fair hygiene  Behavior/Motor:  no abnormalities noted  Attitude toward examiner:  cooperative  Speech:  spontaneous, normal rate and normal volume   Mood: \" I am okay. \"  Affect: Flat blunted  Thought processes: Illogical  Thought content: Endorses paranoia denies SI/HI intent or plan denies auditory visual hallucinations  Cognition:  oriented to person, place, and time   Concentration distractible  Insight poor   Judgement poor     ASSESSMENT:   Patient symptoms are:  [] Well controlled  [x] Improving  [] Worsening  [x] No change      Diagnosis:   Principal Problem:    Bipolar affective disorder, depressed, severe, with psychotic behavior ([facility] [address]  Resolved Problems:    * No resolved hospital problems. *      LABS:    No results for input(s): WBC, HGB, PLT in the last 72 hours. No results for input(s): NA, K, CL, CO2, BUN, CREATININE, GLUCOSE in the last 72 hours. No results for input(s): BILITOT, ALKPHOS, AST, ALT in the last 72 hours.   Lab Results   Component Value Date    LABAMPH NOT DETECTED 08/24/2021 BARBSCNU NOT DETECTED 08/24/2021    LABBENZ NOT DETECTED 08/24/2021    LABMETH NOT DETECTED 08/24/2021    OPIATESCREENURINE NOT DETECTED 08/24/2021    PHENCYCLIDINESCREENURINE NOT DETECTED 08/24/2021    ETOH <10 08/24/2021     No results found for: TSH, FREET4  No results found for: LITHIUM  No results found for: VALPROATE, CBMZ        Treatment Plan:  Reviewed current Medications with the patient. Risks, benefits, side effects, drug-to-drug interactions and alternatives to treatment were discussed. Collateral information:   CD evaluation  Encourage patient to attend group and other milieu activities. Discharge planning discussed with the patient and treatment team.    Continue Zyprexa to 10 at bedtime for psychosis  Increase Depakote 500 mg twice daily for mood    PSYCHOTHERAPY/COUNSELING:  [x] Therapeutic interview  [x] Supportive  [] CBT  [] Ongoing  [] Other    [x] Patient continues to need, on a daily basis, active treatment furnished directly by or requiring the supervision of inpatient psychiatric personnel      Anticipated Length of stay: 3 to 7 days based on stability       NOTE: This report was transcribed using voice recognition software. Every effort was made to ensure accuracy; however, inadvertent computerized transcription errors may be present.     Electronically signed by CALEB Jacobo CNP on 8/29/2021 at 11:52 AM

## 2021-08-29 NOTE — PROGRESS NOTES
Patient was out on unit,watching tv with Peers. Denies thoughts of harm to self or others. Denies hearing things not there or seeing things not there. Verbalizes anxiety is 3 out of 10 due to not knowing the unknown. Denies depression. Verbalizes appetite is good. Verbalizes struggles going to sleep. Verbalizes since admission appetite has improved, anxiety has lessened and depression has subsided. Compliant with medications. Does not attend groups. Will continue to monitor.

## 2021-08-29 NOTE — CARE COORDINATION
SW called pt's wife Kena Morales that stated the guns were removed from the home. Kena Morales was provided with the medical records number upon request. Theresa Coronado will update Kena Morales on DC plan/ date when more is known.

## 2021-08-30 PROCEDURE — 6370000000 HC RX 637 (ALT 250 FOR IP): Performed by: PSYCHIATRY & NEUROLOGY

## 2021-08-30 PROCEDURE — 99232 SBSQ HOSP IP/OBS MODERATE 35: CPT | Performed by: NURSE PRACTITIONER

## 2021-08-30 PROCEDURE — 6370000000 HC RX 637 (ALT 250 FOR IP): Performed by: NURSE PRACTITIONER

## 2021-08-30 PROCEDURE — 1240000000 HC EMOTIONAL WELLNESS R&B

## 2021-08-30 RX ADMIN — ACETAMINOPHEN 650 MG: 325 TABLET ORAL at 22:15

## 2021-08-30 RX ADMIN — DIVALPROEX SODIUM 500 MG: 250 TABLET, DELAYED RELEASE ORAL at 21:04

## 2021-08-30 RX ADMIN — NICOTINE POLACRILEX 4 MG: 2 GUM, CHEWING BUCCAL at 14:18

## 2021-08-30 RX ADMIN — NICOTINE POLACRILEX 4 MG: 2 GUM, CHEWING BUCCAL at 21:25

## 2021-08-30 RX ADMIN — ACETAMINOPHEN 650 MG: 325 TABLET ORAL at 06:25

## 2021-08-30 RX ADMIN — DIVALPROEX SODIUM 500 MG: 250 TABLET, DELAYED RELEASE ORAL at 08:24

## 2021-08-30 RX ADMIN — SULFAMETHOXAZOLE AND TRIMETHOPRIM 1 TABLET: 800; 160 TABLET ORAL at 21:04

## 2021-08-30 RX ADMIN — TRAZODONE HYDROCHLORIDE 50 MG: 50 TABLET ORAL at 21:04

## 2021-08-30 RX ADMIN — NICOTINE POLACRILEX 4 MG: 2 GUM, CHEWING BUCCAL at 17:30

## 2021-08-30 RX ADMIN — NICOTINE POLACRILEX 4 MG: 2 GUM, CHEWING BUCCAL at 08:23

## 2021-08-30 RX ADMIN — OLANZAPINE 15 MG: 5 TABLET, FILM COATED ORAL at 21:04

## 2021-08-30 RX ADMIN — SULFAMETHOXAZOLE AND TRIMETHOPRIM 1 TABLET: 800; 160 TABLET ORAL at 08:24

## 2021-08-30 ASSESSMENT — PAIN SCALES - WONG BAKER: WONGBAKER_NUMERICALRESPONSE: 0

## 2021-08-30 ASSESSMENT — PAIN SCALES - GENERAL
PAINLEVEL_OUTOF10: 4
PAINLEVEL_OUTOF10: 5
PAINLEVEL_OUTOF10: 0
PAINLEVEL_OUTOF10: 0

## 2021-08-30 NOTE — CARE COORDINATION
Sw contacted [employer]. Buck Lacey in admissions reported the referral is under review which can take 24-48 hours. sw following.

## 2021-08-30 NOTE — PLAN OF CARE
[facility] Interdisciplinary Treatment Plan Note     Review Date & Time: 8/30/21 1030    Patient was in treatment team.    Estimated Length of Stay Update:  3-5 days   Estimated Discharge Date Update: 5-7 days    EDUCATION:   Learner Progress Toward Treatment Goals: Reviewed results and recommendations of this team    Method: Group    Outcome: Verbalized understanding    PATIENT GOALS: \"get up,shower and go to group\"    PLAN/TREATMENT RECOMMENDATIONS UPDATE: day 14    GOALS UPDATE:  Time frame for Short-Term Goals: 3-5 days      Charles Carney RN

## 2021-08-30 NOTE — PROGRESS NOTES
BEHAVIORAL HEALTH FOLLOW-UP NOTE     8/30/2021     Patient was seen and examined in person, Chart reviewed   Patient's case discussed with staff/team    Chief Complaint: \"I am doing better. \"    Interim History: Patient seen during treatment team.  He seems more relaxed less delusional preoccupied. His Zyprexa was increased yesterday for continued paranoid thoughts. Today he denies SI/HI intent or plan he denies any paranoia or delusions he reports feeling \"better. \"  Reports feeling less depressed he is encouraged to attend groups. He wants to go to inpatient rehab at [facility] on discharge.     Appetite:   [x] Normal/Unchanged  [] Increased  [] Decreased      Sleep:       [x] Normal/Unchanged  [] Fair       [] Poor              Energy:    [x] Normal/Unchanged  [] Increased  [] Decreased        SI [] Present  [x] Absent    HI  []Present  [x] Absent     Aggression:  [] yes  [x] no    Patient is [x] able  [] unable to CONTRACT FOR SAFETY     PAST MEDICAL/PSYCHIATRIC HISTORY:   Past Medical History:   Diagnosis Date    Hypertension        FAMILY/SOCIAL HISTORY:  Family History   Problem Relation Age of Onset    Hypertension Father     COPD Father      Social History     Socioeconomic History    Marital status:      Spouse name: Not on file    Number of children: Not on file    Years of education: Not on file    Highest education level: Not on file   Occupational History    Not on file   Tobacco Use    Smoking status: Current Every Day Smoker     Packs/day: 1.50     Years: 20.00     Pack years: 30.00     Types: Cigarettes    Smokeless tobacco: Former User   Substance and Sexual Activity    Alcohol use: Yes     Comment: occ    Drug use: Not on file    Sexual activity: Not on file   Other Topics Concern    Not on file   Social History Narrative    Not on file     Social Determinants of Health     Financial Resource Strain:     Difficulty of Paying Living Expenses:    Food Insecurity:     Worried About Running Out of Food in the Last Year:     Christa of Food in the Last Year:    Transportation Needs:     Lack of Transportation (Medical):  Lack of Transportation (Non-Medical):    Physical Activity:     Days of Exercise per Week:     Minutes of Exercise per Session:    Stress:     Feeling of Stress :    Social Connections:     Frequency of Communication with Friends and Family:     Frequency of Social Gatherings with Friends and Family:     Attends U[employer] Services:     Active Member of Clubs or Organizations:     Attends Club or Organization Meetings:     Marital Status:    Intimate Partner Violence:     Fear of Current or Ex-Partner:     Emotionally Abused:     Physically Abused:     Sexually Abused:            ROS:  [x] All negative/unchanged except if checked.  Explain positive(checked items) below:  [] Constitutional  [] Eyes  [] Ear/Nose/Mouth/Throat  [] Respiratory  [] CV  [] GI  []   [] Musculoskeletal  [] Skin/Breast  [] Neurological  [] Endocrine  [] Heme/Lymph  [] Allergic/Immunologic    Explanation:     MEDICATIONS:    Current Facility-Administered Medications:     diazePAM (VALIUM) tablet 5 mg, 5 mg, Oral, Q8H PRN, Jodi Lunch, APRN - CNP    OLANZapine (ZYPREXA) tablet 15 mg, 15 mg, Oral, Nightly, Jodi Lunch, APRN - CNP, 15 mg at 08/29/21 2038    divalproex (DEPAKOTE) DR tablet 500 mg, 500 mg, Oral, 2 times per day, Donna Daquan, APRN - CNP, 832 mg at 08/30/21 9045    sulfamethoxazole-trimethoprim (BACTRIM DS;SEPTRA DS) 800-160 MG per tablet 1 tablet, 1 tablet, Oral, 2 times per day, Jodi Lunch, APRN - CNP, 1 tablet at 08/30/21 9085    nicotine polacrilex (NICORETTE) gum 4 mg, 4 mg, Oral, PRN, Donna Daquan, APRN - CNP, 4 mg at 08/30/21 5232    acetaminophen (TYLENOL) tablet 650 mg, 650 mg, Oral, Once, Abelardo Hollowya MD    acetaminophen (TYLENOL) tablet 650 mg, 650 mg, Oral, Q6H PRN, Vangie Fuentes MD, 650 mg at 08/30/21 0625    magnesium hydroxide (MILK OF MAGNESIA) 400 MG/5ML suspension 30 mL, 30 mL, Oral, Daily PRN, Jarod Harris MD    aluminum & magnesium hydroxide-simethicone (MAALOX) 200-200-20 MG/5ML suspension 30 mL, 30 mL, Oral, PRN, Jarod Harris MD    hydrOXYzine (VISTARIL) capsule 50 mg, 50 mg, Oral, TID PRN, Jarod Harris MD, 50 mg at 08/29/21 2038    haloperidol (HALDOL) tablet 5 mg, 5 mg, Oral, Q6H PRN **OR** haloperidol lactate (HALDOL) injection 5 mg, 5 mg, IntraMUSCular, Q6H PRN, Jarod Harris MD    traZODone (DESYREL) tablet 50 mg, 50 mg, Oral, Nightly PRN, Jarod Harris MD, 50 mg at 08/29/21 2038      Examination:  /66   Pulse 62   Temp 97.3 °F (36.3 °C)   Resp 14   Ht 6' (1.829 m)   Wt 200 lb (90.7 kg)   SpO2 98%   BMI 27.12 kg/m²   Gait - steady  Medication side effects(SE): Denies    Mental Status Examination:    Level of consciousness:  within normal limits   Appearance:  fair grooming and fair hygiene  Behavior/Motor:  no abnormalities noted  Attitude toward examiner:  cooperative  Speech:  spontaneous, normal rate and normal volume   Mood: \" I am okay. \"  Affect: Improving  Thought processes: Linear without flight of ideas loose associations  Thought content: Devoid of any auditory visualizations delusions or the perceptual denies. Denies SI/HI intent or plan cognition:  oriented to person, place, and time   Concentration distractible  Insight poor   Judgement poor     ASSESSMENT:   Patient symptoms are:  [] Well controlled  [x] Improving  [] Worsening  [x] No change      Diagnosis:   Principal Problem:    Bipolar affective disorder, depressed, severe, with psychotic behavior ([facility] [address]  Resolved Problems:    * No resolved hospital problems. *      LABS:    No results for input(s): WBC, HGB, PLT in the last 72 hours. No results for input(s): NA, K, CL, CO2, BUN, CREATININE, GLUCOSE in the last 72 hours. No results for input(s): BILITOT, ALKPHOS, AST, ALT in the last 72 hours.   Lab Results   Component Value Date    LABAMPH NOT DETECTED 08/24/2021    BARBSCNU NOT DETECTED 08/24/2021    LABBENZ NOT DETECTED 08/24/2021    LABMETH NOT DETECTED 08/24/2021    OPIATESCREENURINE NOT DETECTED 08/24/2021    PHENCYCLIDINESCREENURINE NOT DETECTED 08/24/2021    ETOH <10 08/24/2021     No results found for: TSH, FREET4  No results found for: LITHIUM  No results found for: VALPROATE, CBMZ        Treatment Plan:  Reviewed current Medications with the patient. Risks, benefits, side effects, drug-to-drug interactions and alternatives to treatment were discussed. Collateral information:   CD evaluation  Encourage patient to attend group and other milieu activities. Discharge planning discussed with the patient and treatment team.    Continue Zyprexa to 10 at bedtime for psychosis  Increase Depakote 500 mg twice daily for mood    PSYCHOTHERAPY/COUNSELING:  [x] Therapeutic interview  [x] Supportive  [] CBT  [] Ongoing  [] Other    [x] Patient continues to need, on a daily basis, active treatment furnished directly by or requiring the supervision of inpatient psychiatric personnel      Anticipated Length of stay: 3 to 7 days based on stability       NOTE: This report was transcribed using voice recognition software. Every effort was made to ensure accuracy; however, inadvertent computerized transcription errors may be present.     Electronically signed by CALEB Cobb CNP on 8/20/0069 at 12:59 PM Ambulatory

## 2021-08-30 NOTE — PROGRESS NOTES
Attended afternoon meet and greet. Updated on staffing and evening expectations.    Participated in logos trivia

## 2021-08-30 NOTE — GROUP NOTE
Group Therapy Note    Date: 8/30/2021    Group Start Time: 1000  Group End Time: 6689  Group Topic: Psychoeducation    [facility]    Amna Cantu, CTRS        Group Therapy Note      Number of participants: 11  Type of group: Psychoeducation  Mode of intervention: Education, Support, Socialization, Exploration, Clarifying, and Problem-solving  Topic: Sleep Hygiene  Objective: Pt will identify 1 way to practice better sleep hygiene in recovery. Notes:  Pt was interactive during group sharing 1 way to practice better sleep hygiene in recovery. Pt gave support and feedback to others. Status After Intervention:  Improved    Participation Level:  Active Listener and Interactive    Participation Quality: Appropriate, Attentive, Sharing and Supportive      Speech:  normal      Thought Process/Content: Logical      Affective Functioning: Congruent      Mood: euthymic      Level of consciousness:  Alert, Oriented x4 and Attentive      Response to Learning: Able to verbalize current knowledge/experience, Able to verbalize/acknowledge new learning, Able to retain information, Capable of insight, Able to change behavior and Progressing to goal      Endings: None Reported    Modes of Intervention: Education, Support, Socialization, Exploration, Clarifying and Problem-solving

## 2021-08-30 NOTE — PLAN OF CARE
Denies SI/HI  denies hallucinations  isolative to room  states that he will start going to groups today that he went to one yesterday but he has been tired  cooperative with meds  will continue to monitor

## 2021-08-30 NOTE — GROUP NOTE
Group Therapy Note    Date: 8/30/2021    Group Start Time: 1100  Group End Time: 1130  Group Topic: Cognitive Skills    [facility]    IRIS [place], ROSALIA        Group Therapy Note    Attendees: 12         Patient's Goal:  To participate in the group discussion on self-care tips and how we can improve our self-care. Notes:  Pt was an active participant in group discussion. Status After Intervention:  Improved    Participation Level:  Active Listener and Interactive    Participation Quality: Appropriate, Attentive, Sharing and Supportive      Speech:  normal      Thought Process/Content: Logical      Affective Functioning: Congruent      Mood: depressed      Level of consciousness:  Alert and Oriented x4      Response to Learning: Able to verbalize current knowledge/experience, Able to verbalize/acknowledge new learning and Able to retain information      Endings: None Reported    Modes of Intervention: Education, Support, Socialization, Exploration, Clarifying and Problem-solving      Discipline Responsible: /Counselor      Signature:  IRIS [place], ROSALIA

## 2021-08-30 NOTE — PROGRESS NOTES
Attended morning community meeting. Updated on staffing and daily routine . Shared goal for the day as to get up and shower and go to groups.

## 2021-08-30 NOTE — PROGRESS NOTES
Patient was out on unit,social with peers. Calm,cooperative. Patient affect was flat. Denies thoughts of harm to self or others,not seeing things not there or hearing things. Denies depression or anxiety. Appetite is good. Verbalizes struggles to get to sleep. Verbalizes appetite is improved and sleep is better. Compliant with medications. Did not attend group. Will continue to monitor.

## 2021-08-31 PROCEDURE — 6370000000 HC RX 637 (ALT 250 FOR IP): Performed by: NURSE PRACTITIONER

## 2021-08-31 PROCEDURE — 6370000000 HC RX 637 (ALT 250 FOR IP): Performed by: PSYCHIATRY & NEUROLOGY

## 2021-08-31 PROCEDURE — 1240000000 HC EMOTIONAL WELLNESS R&B

## 2021-08-31 PROCEDURE — 99232 SBSQ HOSP IP/OBS MODERATE 35: CPT | Performed by: NURSE PRACTITIONER

## 2021-08-31 RX ADMIN — SULFAMETHOXAZOLE AND TRIMETHOPRIM 1 TABLET: 800; 160 TABLET ORAL at 09:26

## 2021-08-31 RX ADMIN — NICOTINE POLACRILEX 4 MG: 2 GUM, CHEWING BUCCAL at 09:49

## 2021-08-31 RX ADMIN — SULFAMETHOXAZOLE AND TRIMETHOPRIM 1 TABLET: 800; 160 TABLET ORAL at 20:50

## 2021-08-31 RX ADMIN — TRAZODONE HYDROCHLORIDE 50 MG: 50 TABLET ORAL at 20:50

## 2021-08-31 RX ADMIN — DIVALPROEX SODIUM 500 MG: 250 TABLET, DELAYED RELEASE ORAL at 09:26

## 2021-08-31 RX ADMIN — NICOTINE POLACRILEX 4 MG: 2 GUM, CHEWING BUCCAL at 20:54

## 2021-08-31 RX ADMIN — OLANZAPINE 15 MG: 5 TABLET, FILM COATED ORAL at 20:50

## 2021-08-31 RX ADMIN — NICOTINE POLACRILEX 4 MG: 2 GUM, CHEWING BUCCAL at 14:16

## 2021-08-31 RX ADMIN — DIVALPROEX SODIUM 500 MG: 250 TABLET, DELAYED RELEASE ORAL at 20:50

## 2021-08-31 ASSESSMENT — PAIN SCALES - GENERAL
PAINLEVEL_OUTOF10: 0

## 2021-08-31 NOTE — GROUP NOTE
Group Therapy Note    Date: 8/31/2021    Group Start Time: 1100  Group End Time: 6745  Group Topic: Cognitive Skills    [facility]  Mehran 15, MSW, LSW        Group Therapy Note    Attendees: 8         Patient's Goal:  To discuss and identify unhealthy and healthy coping strategies. Pt's identified current issue and identify healthy coping skills. Notes:  Pt was an active participant in group discussion. Pt made positive connections with group members. Status After Intervention:  Unchanged    Participation Level:  Active Listener    Participation Quality: Appropriate      Speech:  normal      Thought Process/Content: Logical      Affective Functioning: Congruent      Mood: anxious      Level of consciousness:  Alert and Oriented x4      Response to Learning: Able to verbalize current knowledge/experience      Endings: None Reported    Modes of Intervention: Education, Support, Socialization, Exploration, Clarifying, Problem-solving and Activity      Discipline Responsible: /Counselor

## 2021-08-31 NOTE — PROGRESS NOTES
BEHAVIORAL HEALTH FOLLOW-UP NOTE     8/31/2021     Patient was seen and examined in person, Chart reviewed   Patient's case discussed with staff/team    Chief Complaint: \"I really want to go to rehab. \"    Interim History: Pt up on the unit. Bright pleasant affect. He is attending groups and socializing with peers. Denies paranoia and denies AVH. Denoes SI/HI intent or plan there are  No neurovegetative signs or symptoms of depression  He is looking forward to rehab.      Appetite:   [x] Normal/Unchanged  [] Increased  [] Decreased      Sleep:       [x] Normal/Unchanged  [] Fair       [] Poor              Energy:    [x] Normal/Unchanged  [] Increased  [] Decreased        SI [] Present  [x] Absent    HI  []Present  [x] Absent     Aggression:  [] yes  [x] no    Patient is [x] able  [] unable to CONTRACT FOR SAFETY     PAST MEDICAL/PSYCHIATRIC HISTORY:   Past Medical History:   Diagnosis Date    Hypertension        FAMILY/SOCIAL HISTORY:  Family History   Problem Relation Age of Onset    Hypertension Father     COPD Father      Social History     Socioeconomic History    Marital status:      Spouse name: Not on file    Number of children: Not on file    Years of education: Not on file    Highest education level: Not on file   Occupational History    Not on file   Tobacco Use    Smoking status: Current Every Day Smoker     Packs/day: 1.50     Years: 20.00     Pack years: 30.00     Types: Cigarettes    Smokeless tobacco: Former User   Substance and Sexual Activity    Alcohol use: Yes     Comment: occ    Drug use: Not on file    Sexual activity: Not on file   Other Topics Concern    Not on file   Social History Narrative    Not on file     Social Determinants of Health     Financial Resource Strain:     Difficulty of Paying Living Expenses:    Food Insecurity:     Worried About Running Out of Food in the Last Year:     Christa of Food in the Last Year:    Transportation Needs:     Lack of Transportation (Medical):  Lack of Transportation (Non-Medical):    Physical Activity:     Days of Exercise per Week:     Minutes of Exercise per Session:    Stress:     Feeling of Stress :    Social Connections:     Frequency of Communication with Friends and Family:     Frequency of Social Gatherings with Friends and Family:     Attends Rastafari Services:     Active Member of Clubs or Organizations:     Attends Club or Organization Meetings:     Marital Status:    Intimate Partner Violence:     Fear of Current or Ex-Partner:     Emotionally Abused:     Physically Abused:     Sexually Abused:            ROS:  [x] All negative/unchanged except if checked.  Explain positive(checked items) below:  [] Constitutional  [] Eyes  [] Ear/Nose/Mouth/Throat  [] Respiratory  [] CV  [] GI  []   [] Musculoskeletal  [] Skin/Breast  [] Neurological  [] Endocrine  [] Heme/Lymph  [] Allergic/Immunologic    Explanation:     MEDICATIONS:    Current Facility-Administered Medications:     OLANZapine (ZYPREXA) tablet 15 mg, 15 mg, Oral, Nightly, CALEB Vaca CNP, 15 mg at 08/30/21 2104    divalproex (DEPAKOTE) DR tablet 500 mg, 500 mg, Oral, 2 times per day, CALEB Huertas CNP, 887 mg at 08/31/21 6850    sulfamethoxazole-trimethoprim (BACTRIM DS;SEPTRA DS) 800-160 MG per tablet 1 tablet, 1 tablet, Oral, 2 times per day, CALEB Vaca CNP, 1 tablet at 08/31/21 5855    nicotine polacrilex (NICORETTE) gum 4 mg, 4 mg, Oral, PRN, CALEB Huertas CNP, 4 mg at 08/31/21 1416    acetaminophen (TYLENOL) tablet 650 mg, 650 mg, Oral, Once, Mohsen Sharma MD    acetaminophen (TYLENOL) tablet 650 mg, 650 mg, Oral, Q6H PRN, Luz Nick MD, 650 mg at 08/30/21 2215    magnesium hydroxide (MILK OF MAGNESIA) 400 MG/5ML suspension 30 mL, 30 mL, Oral, Daily PRN, Luz Nick MD    aluminum & magnesium hydroxide-simethicone (MAALOX) 200-200-20 MG/5ML suspension 30 mL, 30 mL, Oral, PRN, Ed Pitts MD    hydrOXYzine (VISTARIL) capsule 50 mg, 50 mg, Oral, TID PRN, Ed Pitts MD, 50 mg at 08/29/21 2038    haloperidol (HALDOL) tablet 5 mg, 5 mg, Oral, Q6H PRN **OR** haloperidol lactate (HALDOL) injection 5 mg, 5 mg, IntraMUSCular, Q6H PRN, Ed Pitts MD    traZODone (DESYREL) tablet 50 mg, 50 mg, Oral, Nightly PRN, Ed Pitts MD, 50 mg at 08/30/21 2104      Examination:  /69   Pulse 64   Temp 98.1 °F (36.7 °C) (Oral)   Resp 16   Ht 6' (1.829 m)   Wt 200 lb (90.7 kg)   SpO2 98%   BMI 27.12 kg/m²   Gait - steady  Medication side effects(SE): Denies    Mental Status Examination:    Level of consciousness:  within normal limits   Appearance:  fair grooming and fair hygiene  Behavior/Motor:  no abnormalities noted  Attitude toward examiner:  cooperative  Speech:  spontaneous, normal rate and normal volume   Mood: \" I am okay. \"  Affect: Improving  Thought processes: Linear without flight of ideas loose associations  Thought content: Devoid of any auditory visualizations delusions or the perceptual denies. Denies SI/HI intent or plan cognition:  oriented to person, place, and time   Concentration distractible  Insight poor   Judgement poor     ASSESSMENT:   Patient symptoms are:  [] Well controlled  [x] Improving  [] Worsening  [x] No change      Diagnosis:   Principal Problem:    Bipolar affective disorder, depressed, severe, with psychotic behavior ([facility] [address]  Resolved Problems:    * No resolved hospital problems. *      LABS:    No results for input(s): WBC, HGB, PLT in the last 72 hours. No results for input(s): NA, K, CL, CO2, BUN, CREATININE, GLUCOSE in the last 72 hours. No results for input(s): BILITOT, ALKPHOS, AST, ALT in the last 72 hours.   Lab Results   Component Value Date    LABAMPH NOT DETECTED 08/24/2021    BARBSCNU NOT DETECTED 08/24/2021    LABBENZ NOT DETECTED 08/24/2021    LABMETH NOT DETECTED 08/24/2021    OPIATESCREENURINE NOT DETECTED 08/24/2021 PHENCYCLIDINESCREENURINE NOT DETECTED 08/24/2021    ETOH <10 08/24/2021     No results found for: TSH, FREET4  No results found for: LITHIUM  No results found for: VALPROATE, CBMZ        Treatment Plan:  Reviewed current Medications with the patient. Risks, benefits, side effects, drug-to-drug interactions and alternatives to treatment were discussed. Collateral information:   CD evaluation  Encourage patient to attend group and other milieu activities. Discharge planning discussed with the patient and treatment team.    Continue Zyprexa to 10 at bedtime for psychosis  Increase Depakote 500 mg twice daily for mood    PSYCHOTHERAPY/COUNSELING:  [x] Therapeutic interview  [x] Supportive  [] CBT  [] Ongoing  [] Other    [x] Patient continues to need, on a daily basis, active treatment furnished directly by or requiring the supervision of inpatient psychiatric personnel      Anticipated Length of stay: 3 to 7 days based on stability       NOTE: This report was transcribed using voice recognition software. Every effort was made to ensure accuracy; however, inadvertent computerized transcription errors may be present.     Electronically signed by CALEB Huertas CNP on 6/66/8050 at 2:50 PM

## 2021-08-31 NOTE — PROGRESS NOTES
Attended afternoon meet and greet. Updated on staffing and evening expectations. Participated in movie trivia. Lazarus Florentino

## 2021-08-31 NOTE — CARE COORDINATION
Sw spoke with [facility] and provided additional information at their request. Phone was transferred to pt to complete a phone interview. While pt was completing a phone interview, Sw received a voicemail from Reuben Baires in admissions reporting that they declined pt. She stated that pt primary needs appear to be psych with secondary in chemical dependence. They are concerned with his SI and his paranoia. Reuben Baires recommended that pt go to [facility] or [facility]. Sw left a voicemail to discuss this information. Sw spoke with pt about this information. Pt is hopeful that [facility] will reconsider, if not he he has no other preferences on where he goes.      Referrals were faxed to:    [facility]

## 2021-08-31 NOTE — PROGRESS NOTES
Attended morning community meeting. Updated on staffing and daily goals. Shared goal for the day as to get up and stay up today.

## 2021-08-31 NOTE — PLAN OF CARE
Problem: Altered Mood, Depressive Behavior:  Goal: Able to verbalize and/or display a decrease in depressive symptoms  Description: Able to verbalize and/or display a decrease in depressive symptoms  Outcome: Met This Shift  Pt. Reports improved mood. Goal: Absence of self-harm  Description: Absence of self-harm  Outcome: Met This Shift  No self harm. Problem: Altered Mood, Psychotic Behavior:  Goal: Able to verbalize reality based thinking  Description: Able to verbalize reality based thinking  Outcome: Met This Shift  No voiced delusions.

## 2021-08-31 NOTE — GROUP NOTE
Group Therapy Note    Date: 8/31/2021    Group Start Time: 1000  Group End Time: 1050  Group Topic: Psychoeducation    [facility]    Amna Cantu, CTRS        Group Therapy Note    Number of participants: 11  Type of group: Psychoeducation  Mode of intervention: Education, Support, Socialization, Exploration, Clarifying, and Problem-solving  Topic: Anger Management Tips  Objective: Pt will identify 1 way to manage anger better in recovery. Notes:  Pt was interactive during group sharing 1 way to manager anger better in recovery. Pt gave support and feedback to others. Status After Intervention:  Improved    Participation Level:  Active Listener and Interactive    Participation Quality: Appropriate, Attentive, Sharing and Supportive      Speech:  normal      Thought Process/Content: Logical      Affective Functioning: Congruent      Mood: euthymic      Level of consciousness:  Alert, Oriented x4 and Attentive      Response to Learning: Able to verbalize current knowledge/experience, Able to verbalize/acknowledge new learning, Able to retain information, Capable of insight, Able to change behavior and Progressing to goal      Endings: None Reported    Modes of Intervention: Education, Support, Socialization, Exploration, Clarifying, Problem-solving and Activity

## 2021-08-31 NOTE — PLAN OF CARE
Patient denies suicidal ideation, homicidal ideations and AVH. Patient denies anxiety and depression. Patient appear flat, sad but does brighten with conversation. Presents calm and cooperative during assessment. Patient is out on the unit and is social with peers. Medications taken without issue. No complaints or concerns verbalized at this time. No unit problems reported. Will continue to observe and support.     Problem: Altered Mood, Depressive Behavior:  Goal: Able to verbalize and/or display a decrease in depressive symptoms  Description: Able to verbalize and/or display a decrease in depressive symptoms  8/30/2021 2003 by Marce Lyle RN  Outcome: Ongoing     Problem: Altered Mood, Depressive Behavior:  Goal: Absence of self-harm  Description: Absence of self-harm  8/30/2021 2003 by Marce Lyle RN  Outcome: Ongoing     Problem: Altered Mood, Psychotic Behavior:  Goal: Able to verbalize reality based thinking  Description: Able to verbalize reality based thinking  8/30/2021 2003 by Marce Lyle RN  Outcome: Ongoing     Problem: Altered Mood, Psychotic Behavior:  Goal: Ability to interact with others will improve  Description: Ability to interact with others will improve  8/30/2021 2003 by Marce Lyle RN  Outcome: Ongoing

## 2021-09-01 PROCEDURE — 6370000000 HC RX 637 (ALT 250 FOR IP): Performed by: PSYCHIATRY & NEUROLOGY

## 2021-09-01 PROCEDURE — 6370000000 HC RX 637 (ALT 250 FOR IP): Performed by: NURSE PRACTITIONER

## 2021-09-01 PROCEDURE — 1240000000 HC EMOTIONAL WELLNESS R&B

## 2021-09-01 RX ADMIN — TRAZODONE HYDROCHLORIDE 50 MG: 50 TABLET ORAL at 20:54

## 2021-09-01 RX ADMIN — NICOTINE POLACRILEX 4 MG: 2 GUM, CHEWING BUCCAL at 21:11

## 2021-09-01 RX ADMIN — SULFAMETHOXAZOLE AND TRIMETHOPRIM 1 TABLET: 800; 160 TABLET ORAL at 09:37

## 2021-09-01 RX ADMIN — OLANZAPINE 15 MG: 5 TABLET, FILM COATED ORAL at 20:54

## 2021-09-01 RX ADMIN — NICOTINE POLACRILEX 4 MG: 2 GUM, CHEWING BUCCAL at 09:38

## 2021-09-01 RX ADMIN — DIVALPROEX SODIUM 500 MG: 250 TABLET, DELAYED RELEASE ORAL at 20:54

## 2021-09-01 RX ADMIN — DIVALPROEX SODIUM 500 MG: 250 TABLET, DELAYED RELEASE ORAL at 09:37

## 2021-09-01 RX ADMIN — SULFAMETHOXAZOLE AND TRIMETHOPRIM 1 TABLET: 800; 160 TABLET ORAL at 20:54

## 2021-09-01 RX ADMIN — NICOTINE POLACRILEX 4 MG: 2 GUM, CHEWING BUCCAL at 16:55

## 2021-09-01 NOTE — CARE COORDINATION
Another voicemail was left with [facility] admissions to discuss pt referral.    Mickie Whitney requesting a copy of the front and back of pt insurance card. Received from pt wife, was sent to Oscar Tai at [facility]. Oscar Tai will call with more information. Sw spoke with wife about the denials and other options for discharge. Heladio Meek reports pt is still paranoid, reporting that he feels the reason he is getting denied from rehabs is because the state/government does not want him to get treated or help. She feels pt is not being completely honest wth us about his systems and feelings because he wants to be discharged. Pt was denied from [facility]. [place] reports they are still waiting, sw to call again later.

## 2021-09-01 NOTE — PLAN OF CARE
Pleasant upon approach. Spending much of his time in [employer]. Denies suicidal thoughts or intent to harm himself or others. No voiced delusions. Denies hallucinations.

## 2021-09-01 NOTE — CARE COORDINATION
Pt was accepted to [facility] for admission tomorrow at 2pm.     NP and wife informed. Wife Larry Caballero reported either herself or another family member will be able to assist with transportation tomorrow.

## 2021-09-01 NOTE — GROUP NOTE
Group Therapy Note    Date: 9/1/2021    Group Start Time: 1000  Group End Time: 0412  Group Topic: Psychoeducation    [facility]    Albaro Renan, [address]                                                                        Group Therapy Note    Date: 9/1/2021  Type of Group: Psychoeducation    Wellness Binder Information  Module Name:  id of stressors   Patient's Goal: will be able to id daily and life events     Notes:  pleasant and engaged willing to share in group     Status After Intervention:  Improved    Participation Level:  Active Listener and Interactive    Participation Quality: Appropriate, Attentive, Sharing, and Supportive      Speech:  normal     Thought Process/Content: Logical      Affective Functioning: Congruent      Mood: euthymic      Level of consciousness:  Alert, Oriented x4, and Attentive      Response to Learning: Able to verbalize/acknowledge new learning, Able to retain information, and Progressing to goal      Endings: None Reported    Modes of Intervention: Education, Support, Socialization, Exploration, and Problem-solving      Discipline Responsible: Psychoeducational Specialist      Signature:  Mario Sanchez

## 2021-09-01 NOTE — CARE COORDINATION
Pt denied at [facility]. Stating pt needs a higher level of care and would benefit from a dual dx program.     SW left message with admissions coordinator, Evaristo, at [facility]. No answer.

## 2021-09-01 NOTE — PROGRESS NOTES
Attended afternoon meet and greet. Updated on staffing and evening expectations. Participated in unit questions and entertainment activity.

## 2021-09-01 NOTE — GROUP NOTE
Group Therapy Note    Date: 9/1/2021    Group Start Time: 1100  Group End Time: 1120  Group Topic: Cognitive Skills    [facility]    IRIS Dallas LSW        Group Therapy Note    Attendees: 13         Patient's Goal:  To participate in the group discussion on \"I\" statements. Notes:  Pt was an active participant. Status After Intervention:  Improved    Participation Level:  Active Listener and Interactive    Participation Quality: Appropriate, Attentive, Sharing and Supportive      Speech:  normal      Thought Process/Content: Logical      Affective Functioning: Congruent      Mood: anxious      Level of consciousness:  Alert and Oriented x4      Response to Learning: Able to verbalize current knowledge/experience      Endings: None Reported    Modes of Intervention: Education, Support, Socialization, Exploration, Clarifying and Problem-solving      Discipline Responsible: /Counselor      Signature:  IRIS Claros, ROSALIA

## 2021-09-01 NOTE — PLAN OF CARE
Problem: Altered Mood, Depressive Behavior:  Goal: Able to verbalize and/or display a decrease in depressive symptoms  Description: Able to verbalize and/or display a decrease in depressive symptoms  Outcome: Ongoing     Problem: Altered Mood, Psychotic Behavior:  Goal: Ability to interact with others will improve  Description: Ability to interact with others will improve  Outcome: Ongoing              Patient flat and depressed. Guarded and suspicious. Has not voiced any delusions. Denies suicidal and homicidal thoughts. Denies hallucinations.

## 2021-09-01 NOTE — PROGRESS NOTES
Attended community meeting. Updated on staffing and daily expectations. Shared goal for the day as to attend all classes, be active.

## 2021-09-02 VITALS
OXYGEN SATURATION: 98 % | DIASTOLIC BLOOD PRESSURE: 67 MMHG | WEIGHT: 200 LBS | RESPIRATION RATE: 14 BRPM | HEART RATE: 64 BPM | TEMPERATURE: 97.1 F | SYSTOLIC BLOOD PRESSURE: 117 MMHG | BODY MASS INDEX: 27.09 KG/M2 | HEIGHT: 72 IN

## 2021-09-02 LAB — VALPROIC ACID LEVEL: 55 MCG/ML (ref 50–100)

## 2021-09-02 PROCEDURE — 6370000000 HC RX 637 (ALT 250 FOR IP): Performed by: NURSE PRACTITIONER

## 2021-09-02 PROCEDURE — 99239 HOSP IP/OBS DSCHRG MGMT >30: CPT | Performed by: NURSE PRACTITIONER

## 2021-09-02 PROCEDURE — 36415 COLL VENOUS BLD VENIPUNCTURE: CPT

## 2021-09-02 PROCEDURE — 80164 ASSAY DIPROPYLACETIC ACD TOT: CPT

## 2021-09-02 RX ORDER — OLANZAPINE 15 MG/1
15 TABLET ORAL NIGHTLY
Qty: 30 TABLET | Refills: 0 | Status: SHIPPED | OUTPATIENT
Start: 2021-09-02 | End: 2021-11-15

## 2021-09-02 RX ORDER — DIVALPROEX SODIUM 500 MG/1
500 TABLET, DELAYED RELEASE ORAL EVERY 12 HOURS SCHEDULED
Qty: 60 TABLET | Refills: 0 | Status: SHIPPED | OUTPATIENT
Start: 2021-09-02 | End: 2021-11-15

## 2021-09-02 RX ADMIN — DIVALPROEX SODIUM 500 MG: 250 TABLET, DELAYED RELEASE ORAL at 09:09

## 2021-09-02 RX ADMIN — NICOTINE POLACRILEX 4 MG: 2 GUM, CHEWING BUCCAL at 09:08

## 2021-09-02 RX ADMIN — SULFAMETHOXAZOLE AND TRIMETHOPRIM 1 TABLET: 800; 160 TABLET ORAL at 09:09

## 2021-09-02 ASSESSMENT — PAIN SCALES - GENERAL: PAINLEVEL_OUTOF10: 0

## 2021-09-02 NOTE — CARE COORDINATION
Sw received a call from pt wife requesting for pt to go to [facility] instead of [facility], reporting she spoke with someone there and was informed he may be able to walk in today for admission. Sw spoke with wife about this, informing her that sw will talk with NP about her request and will call her shortly. Bess aware and understanding that if NP denies and if there are no beds for pt to be a walk in that pt will still be discharging to [facility] today as he is ready for discharge. Jessica spoke with NP about this request, was informed that pt can discharge to [facility] or home to wife and then to [facility]. Sw spoke with pt about this. Pt requesting to think about it and to talk with his wife about the request before he makes a decision. Jessica following. Jessica spoke with Healdio Meek, provided an update on the above information.

## 2021-09-02 NOTE — GROUP NOTE
Group Therapy Note    Date: 9/2/2021    Group Start Time: 1000  Group End Time: 8607  Group Topic: Psychoeducation    [facility] [address]        Group Therapy Note    Attendees: 11         Notes: Attended discussion on building new habits. Able to share and relate experiences with peers. Status After Intervention:  Improved    Participation Level:  Active Listener and Interactive    Participation Quality: Appropriate, Attentive and Sharing      Speech:  normal      Thought Process/Content: Logical      Affective Functioning: Congruent      Mood: euthymic      Level of consciousness:  Alert and Attentive      Response to Learning: Capable of insight, Able to change behavior and Progressing to goal      Endings: None Reported    Modes of Intervention: Education, Support, Socialization and Exploration      Discipline Responsible: Psychoeducational Specialist      Signature:  Andrez Vargas, [address]

## 2021-09-02 NOTE — PROGRESS NOTES
[facility]  Discharge Note    Pt discharged with followings belongings:   Dentures: None  Vision - Corrective Lenses: None  Hearing Aid: None  Jewelry: Necklace (1 silver colored)  Body Piercings Removed: N/A  Clothing: Shirt, Pants, Undergarments (Comment) (4 shirts, 2 pants, 4 boxers)  Were All Patient Medications Collected?: Yes  Other Valuables: Other (Comment) (lighter, blue bag)   Valuables sent home with patient. Patient education on aftercare instructions: yes . Patient verbalize understanding of AVS:  yes.     Status EXAM upon discharge:  Status and Exam  Normal: No  Facial Expression: Worried  Affect: Congruent  Level of Consciousness: Alert  Mood:Normal: No  Mood: Depressed, Anhedonia  Motor Activity:Normal: Yes  Motor Activity: Decreased  Interview Behavior: Cooperative  Preception: [place] to Person, Layvonne Harris to Time, [place] to Place, [place] to Situation  Attention:Normal: No  Attention: Distractible  Thought Processes: Circumstantial  Thought Content:Normal: No  Thought Content: Paranoia, Obsessions  Hallucinations: None  Delusions: No  Delusions: Persecution  Memory:Normal: No  Memory: Poor Recent  Insight and Judgment: No  Insight and Judgment: Poor Judgment, Poor Insight  Present Suicidal Ideation: No  Present Homicidal Ideation: No      Metabolic Screening:    Lab Results   Component Value Date    LABA1C 5.3 08/26/2021       Lab Results   Component Value Date    CHOL 117 08/26/2021     Lab Results   Component Value Date    TRIG 88 08/26/2021     Lab Results   Component Value Date    HDL 34 08/26/2021     No components found for: [facility] EVALUATION AND TREATMENT [place]  Lab Results   Component Value Date    LABVLDL 18 08/26/2021       Peter Cueto RN

## 2021-09-02 NOTE — GROUP NOTE
Group Therapy Note    Date: 9/2/2021    Group Start Time: 1050  Group End Time: 0212  Group Topic: Cognitive Skills    [facility]  Av. Macie Stewart, MSW, LSW        Group Therapy Note    Attendees: 13         Patient's Goal:  Pt will be able to identify how to build happiness and implement the skills into their life. Notes:  Pt participated in group and made connections. Status After Intervention:  Improved    Participation Level:  Active Listener and Interactive    Participation Quality: Appropriate and Attentive      Speech:  normal      Thought Process/Content: Logical      Affective Functioning: Congruent      Mood: anxious      Level of consciousness:  Alert and Oriented x4      Response to Learning: Able to verbalize current knowledge/experience and Able to retain information      Endings: None Reported    Modes of Intervention: Education, Support, Socialization, Exploration, Clarifying and Problem-solving      Discipline Responsible: /Counselor      Signature:  IRIS Reid, [facility]

## 2021-09-02 NOTE — PROGRESS NOTES
CLINICAL PHARMACY NOTE: MEDS TO BEDS    Total # of Prescriptions Filled: 2   The following medications were delivered to the patient:  · DEPAKOTE  MG  · ZYPREXA 15 MG    Additional Documentation:

## 2021-09-02 NOTE — PLAN OF CARE
Problem: Altered Mood, Depressive Behavior:  Goal: Able to verbalize and/or display a decrease in depressive symptoms  Description: Able to verbalize and/or display a decrease in depressive symptoms  9/1/2021 2027 by Shanika Preston RN  Outcome: Ongoing     Problem: Altered Mood, Depressive Behavior:  Goal: Absence of self-harm  Description: Absence of self-harm  Outcome: Ongoing     Problem: Altered Mood, Psychotic Behavior:  Goal: Able to verbalize reality based thinking  Description: Able to verbalize reality based thinking  Outcome: Ongoing     Problem: Altered Mood, Psychotic Behavior:  Goal: Ability to interact with others will improve  Description: Ability to interact with others will improve  9/1/2021 2027 by Shanika Preston RN  Outcome: Ongoing     Problem: Pain:  Goal: Pain level will decrease  Description: Pain level will decrease  Outcome: Ongoing     Problem: Pain:  Goal: Control of acute pain  Description: Control of acute pain  Outcome: Ongoing     Problem: Pain:  Goal: Control of acute pain  Description: Control of acute pain  Outcome: Ongoing     Problem: Pain:  Goal: Control of chronic pain  Description: Control of chronic pain  Outcome: Ongoing

## 2021-09-02 NOTE — DISCHARGE SUMMARY
 Frequency of Social Gatherings with Friends and Family:     Attends Sikh Services:     Active Member of Clubs or Organizations:     Attends Club or Organization Meetings:     Marital Status:    Intimate Partner Violence:     Fear of Current or Ex-Partner:     Emotionally Abused:     Physically Abused:     Sexually Abused:        MEDICATIONS:    Current Facility-Administered Medications:     OLANZapine (ZYPREXA) tablet 15 mg, 15 mg, Oral, Nightly, ACLEB Ritter - CNP, 15 mg at 09/01/21 2054    divalproex (DEPAKOTE) DR tablet 500 mg, 500 mg, Oral, 2 times per day, Sofía Estimable APRN - CNP, 299 mg at 09/01/21 2054    sulfamethoxazole-trimethoprim (BACTRIM DS;SEPTRA DS) 800-160 MG per tablet 1 tablet, 1 tablet, Oral, 2 times per day, CALEB Ritter - CNP, 1 tablet at 09/01/21 2054    nicotine polacrilex (NICORETTE) gum 4 mg, 4 mg, Oral, PRN, Sofía Estimewelina APRN - CNP, 4 mg at 09/01/21 2111    acetaminophen (TYLENOL) tablet 650 mg, 650 mg, Oral, Once, Larissa Grewal MD    acetaminophen (TYLENOL) tablet 650 mg, 650 mg, Oral, Q6H PRN, Delroy Felipe MD, 650 mg at 08/30/21 2215    magnesium hydroxide (MILK OF MAGNESIA) 400 MG/5ML suspension 30 mL, 30 mL, Oral, Daily PRN, Delroy Felipe MD    aluminum & magnesium hydroxide-simethicone (MAALOX) 200-200-20 MG/5ML suspension 30 mL, 30 mL, Oral, PRN, Delroy Felipe MD    hydrOXYzine (VISTARIL) capsule 50 mg, 50 mg, Oral, TID PRN, Delroy Felipe MD, 50 mg at 08/29/21 2038    haloperidol (HALDOL) tablet 5 mg, 5 mg, Oral, Q6H PRN **OR** haloperidol lactate (HALDOL) injection 5 mg, 5 mg, IntraMUSCular, Q6H PRN, Delroy Felipe MD    traZODone (DESYREL) tablet 50 mg, 50 mg, Oral, Nightly PRN, Delroy Felipe MD, 50 mg at 09/01/21 2054    Examination:  /67   Pulse 64   Temp 97.1 °F (36.2 °C) (Oral)   Resp 14   Ht 6' (1.829 m)   Wt 200 lb (90.7 kg)   SpO2 98%   BMI 27.12 kg/m²   Gait - steady    HOSPITAL COURSE[de-identified]     Patient was admitted to the unit on 8/24/2021 was closely monitored for suicidal ideations. He was evaluated was treated with Depakote 500 mg twice daily and Zyprexa 15 mg at bedtime for mood and psychosis. Medical events were insignificant and patient continued to improve on the floor. He start coming out of his room he is attending groups to socializing with peers. He never made any suicidal statements or any suicidal gestures while in the unit. Social workers obtain collateral information from patient's wife who was able to voicing concerns that she had. She reported no safety concerns no access to any weapons. Patient's wife reported that she wanted patient to be admitted to a dual diagnosis treatment center. She called and made arrangements for patient to be admitted and was going to pick patient up from our unit to take him directly to the [place]. Treatment team felt the patient obtain the maximum benefit from his hospitalization he was set up with an outpatient mental health agency for outpatient follow-up services. At the time of discharge patient did not show any impulsive behavior. He was up on the unit he was attending groups and socializing with peers. He vehemently denied any suicidal homicidal ideations intent or plan. He was eating well and sleeping well there are no neurovegetative signs or symptoms of depression he denied any auditory or visual hallucinations. There are no overt or covert signs of psychosis. He was appreciative of the help that he received here. This patient no longer meets criteria for inpatient hospitalization.           No AVH or paranoid thoughts  No Hopeless or worthless feeling  No active SI/HI  Appetite:  [x] Normal  [] Increased  [] Decreased    Sleep:       [x] Normal  [] Fair       [] Poor            Energy:    [x] Normal  [] Increased  [] Decreased     SI [] Present  [x] Absent  HI  []Present  [x] Absent   Aggression:  [] yes  [x] no  Patient is [x] able  [] unable to CONTRACT FOR SAFETY   Medication side effects(SE):  [x] None(Psych. Meds.) [] Other      Mental Status Examination on discharge:    Level of consciousness:  within normal limits   Appearance:  well-appearing  Behavior/Motor:  no abnormalities noted  Attitude toward examiner:  attentive and good eye contact  Speech:  spontaneous, normal rate and normal volume   Mood: \"My mood is good\"  Affect: Appropriate and pleasant  Thought processes: Linear without flight of ideas loose associations  Thought content: Devoid of any auditory visual hallucinations delusions or any other perceptual abnormalities. Denies SI/HI intent or plan  Cognition:  oriented to person, place, and time   Concentration intact  Memory intact  Insight good   Judgement fair   Fund of Knowledge adequate      ASSESSMENT:  Patient symptoms are:  [x] Well controlled  [x] Improving  [] Worsening  [] No change    Reason for more than one antipsychotic:  [] N/A  [] 3 Failed Monotherapy attempts (Drugs tried:)  [] Crossover to a new antipsychotic  [] Taper to Monotherapy from Polypharmacy  [] Augmentation of clozapine therapy due to treatment resistance to single therapy    Diagnosis:  Principal Problem:    Bipolar affective disorder, depressed, severe, with psychotic behavior ([facility] [address]  Resolved Problems:    * No resolved hospital problems. *      LABS:    No results for input(s): WBC, HGB, PLT in the last 72 hours. No results for input(s): NA, K, CL, CO2, BUN, CREATININE, GLUCOSE in the last 72 hours. No results for input(s): BILITOT, ALKPHOS, AST, ALT in the last 72 hours.   Lab Results   Component Value Date    LABAMPH NOT DETECTED 08/24/2021    BARBSCNU NOT DETECTED 08/24/2021    LABBENZ NOT DETECTED 08/24/2021    LABMETH NOT DETECTED 08/24/2021    OPIATESCREENURINE NOT DETECTED 08/24/2021    PHENCYCLIDINESCREENURINE NOT DETECTED 08/24/2021    ETOH <10 08/24/2021     No results found for: TSH, FREET4  No results found for: LITHIUM  No results found for: VALPROATE, CBMZ    RISK ASSESSMENT AT DISCHARGE: Low risk for suicide and homicide. Treatment Plan:  Reviewed current Medications with the patient. Education provided on the complaince with treatment. Risks, benefits, side effects, drug-to-drug interactions and alternatives to treatment were discussed. Encourage patient to attend outpatient follow up appointment and therapy. Patient was advised to call the outpatient provider, visit the nearest ED or call 911 if symptoms are not manageable. Patient's family member was contacted prior to the discharge.          Medication List      START taking these medications    divalproex 500 MG DR tablet  Commonly known as: DEPAKOTE  Take 1 tablet by mouth every 12 hours     nicotine polacrilex 4 MG gum  Commonly known as: NICORETTE  Take 1 each by mouth as needed for Smoking cessation     OLANZapine 15 MG tablet  Commonly known as: ZYPREXA  Take 1 tablet by mouth nightly        STOP taking these medications    citalopram 10 MG tablet  Commonly known as: CELEXA     lisinopril 10 MG tablet  Commonly known as: PRINIVIL;ZESTRIL        ASK your doctor about these medications    sulfamethoxazole-trimethoprim 800-160 MG per tablet  Commonly known as: BACTRIM DS;SEPTRA DS           Where to Get Your Medications      These medications were sent to [facility] [address] [address]    Phone: [phone]   · divalproex 500 MG DR tablet  · OLANZapine 15 MG tablet     Information about where to get these medications is not yet available    Ask your nurse or doctor about these medications  · nicotine polacrilex 4 MG gum       Patient is counseled if he continues to abuse drugs or alcohol he could act out impulsively causing serious harm to himself or others even though may be unintentional.  He demonstrated understanding of this and has the capacity understand this    Patient is counseled hisr mental health treatment will be difficult to optimize with ongoing use of drugs or alcohol he demonstrate understanding of this as the past understand this     Patient is counseled that he he uses any amount of opiates after any clean time he could have an unintentional overdose he demonstrated understanding of this and has the capacity to understand this    Patient is counseled he must remain compliant with all medications outpatient follow-up ointments    Patient is discharged home in stable condition    TIME SPEND - 35 MINUTES TO COMPLETE THE EVALUATION, DISCHARGE SUMMARY, MEDICATION RECONCILIATION AND FOLLOW UP CARE     Signed:  CALEB Magana CNP  9/5/0780  9:07 AM

## 2021-09-02 NOTE — CARE COORDINATION
In order to ensure appropriate transition and discharge planning is in place, the following documents have been transmitted to [facility] as the new outpatient provider:     The d/c diagnosis was transmitted to the next care provider   The reason for hospitalization was transmitted to the next care provider   The d/c medications (dosage and indication) were transmitted to the next care provider    The continuing care plan was transmitted to the next care provider

## 2021-09-02 NOTE — PROGRESS NOTES
Trazadone for sleep . Also asked for Vistaril at same time, explained if does not go to sleep can come out and ask for vistaril . States is suppose to go to [facility] [employer]. Hx of ETOH and herb called Cecilia to help relax him and for pain. Talked about\" higher power may have separate plans  for him ,other than working at [employer] last 3 months and worked at [employer] for a year. 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 Usually helps wife with 3year old and 3year old children.

## 2021-09-02 NOTE — PROGRESS NOTES
Attended morning community meeting. Updated on staffing and daily expectations. Shared goal for the day as to take my meds.

## 2021-09-29 ENCOUNTER — APPOINTMENT (OUTPATIENT)
Dept: GENERAL RADIOLOGY | Age: 41
End: 2021-09-29
Payer: COMMERCIAL

## 2021-09-29 ENCOUNTER — HOSPITAL ENCOUNTER (EMERGENCY)
Age: 41
Discharge: HOME | End: 2021-09-30
Payer: COMMERCIAL

## 2021-09-29 VITALS
HEART RATE: 86 BPM | HEIGHT: 72 IN | RESPIRATION RATE: 19 BRPM | TEMPERATURE: 98.7 F | WEIGHT: 195 LBS | DIASTOLIC BLOOD PRESSURE: 89 MMHG | SYSTOLIC BLOOD PRESSURE: 144 MMHG | BODY MASS INDEX: 26.41 KG/M2 | OXYGEN SATURATION: 98 %

## 2021-09-29 DIAGNOSIS — S61.213A LACERATION OF LEFT MIDDLE FINGER WITHOUT FOREIGN BODY WITHOUT DAMAGE TO NAIL, INITIAL ENCOUNTER: Primary | ICD-10-CM

## 2021-09-29 PROCEDURE — 96374 THER/PROPH/DIAG INJ IV PUSH: CPT

## 2021-09-29 PROCEDURE — 6370000000 HC RX 637 (ALT 250 FOR IP): Performed by: NURSE PRACTITIONER

## 2021-09-29 PROCEDURE — 6360000002 HC RX W HCPCS: Performed by: NURSE PRACTITIONER

## 2021-09-29 PROCEDURE — 2500000003 HC RX 250 WO HCPCS: Performed by: NURSE PRACTITIONER

## 2021-09-29 PROCEDURE — 73130 X-RAY EXAM OF HAND: CPT

## 2021-09-29 PROCEDURE — 90714 TD VACC NO PRESV 7 YRS+ IM: CPT | Performed by: NURSE PRACTITIONER

## 2021-09-29 PROCEDURE — 99283 EMERGENCY DEPT VISIT LOW MDM: CPT

## 2021-09-29 PROCEDURE — 90471 IMMUNIZATION ADMIN: CPT | Performed by: NURSE PRACTITIONER

## 2021-09-29 PROCEDURE — 99283 EMERGENCY DEPT VISIT LOW MDM: CPT | Performed by: ORTHOPAEDIC SURGERY

## 2021-09-29 RX ORDER — LIDOCAINE HYDROCHLORIDE 10 MG/ML
INJECTION, SOLUTION INFILTRATION; PERINEURAL
Status: COMPLETED
Start: 2021-09-29 | End: 2021-09-30

## 2021-09-29 RX ORDER — CEPHALEXIN 500 MG/1
500 CAPSULE ORAL 3 TIMES DAILY
Qty: 30 CAPSULE | Refills: 0 | Status: SHIPPED | OUTPATIENT
Start: 2021-09-29 | End: 2021-09-30 | Stop reason: SDUPTHER

## 2021-09-29 RX ORDER — TETANUS AND DIPHTHERIA TOXOIDS ADSORBED 2; 2 [LF]/.5ML; [LF]/.5ML
0.5 INJECTION INTRAMUSCULAR ONCE
Status: DISCONTINUED | OUTPATIENT
Start: 2021-09-29 | End: 2021-09-29

## 2021-09-29 RX ORDER — HYDROCODONE BITARTRATE AND ACETAMINOPHEN 5; 325 MG/1; MG/1
1 TABLET ORAL ONCE
Status: COMPLETED | OUTPATIENT
Start: 2021-09-29 | End: 2021-09-29

## 2021-09-29 RX ORDER — LIDOCAINE HYDROCHLORIDE 10 MG/ML
20 INJECTION, SOLUTION INFILTRATION; PERINEURAL ONCE
Status: COMPLETED | OUTPATIENT
Start: 2021-09-29 | End: 2021-09-30

## 2021-09-29 RX ADMIN — HYDROCODONE BITARTRATE AND ACETAMINOPHEN 1 TABLET: 5; 325 TABLET ORAL at 20:23

## 2021-09-29 RX ADMIN — CLOSTRIDIUM TETANI TOXOID ANTIGEN (FORMALDEHYDE INACTIVATED) AND CORYNEBACTERIUM DIPHTHERIAE TOXOID ANTIGEN (FORMALDEHYDE INACTIVATED) 0.5 ML: 5; 2 INJECTION, SUSPENSION INTRAMUSCULAR at 20:21

## 2021-09-29 RX ADMIN — HYDROCODONE BITARTRATE AND ACETAMINOPHEN 1 TABLET: 5; 325 TABLET ORAL at 22:56

## 2021-09-29 RX ADMIN — CEFAZOLIN 2000 MG: 10 INJECTION, POWDER, FOR SOLUTION INTRAVENOUS at 21:48

## 2021-09-29 ASSESSMENT — PAIN DESCRIPTION - DESCRIPTORS: DESCRIPTORS: SHARP

## 2021-09-29 ASSESSMENT — PAIN DESCRIPTION - LOCATION: LOCATION: FINGER (COMMENT WHICH ONE)

## 2021-09-29 ASSESSMENT — PAIN SCALES - GENERAL
PAINLEVEL_OUTOF10: 10
PAINLEVEL_OUTOF10: 8
PAINLEVEL_OUTOF10: 7

## 2021-09-29 ASSESSMENT — PAIN DESCRIPTION - ORIENTATION: ORIENTATION: RIGHT

## 2021-09-29 ASSESSMENT — PAIN DESCRIPTION - PAIN TYPE: TYPE: ACUTE PAIN

## 2021-09-29 NOTE — ED PROVIDER NOTES
Department of Emergency Medicine   ED  Provider Note  Admit Date/RoomTime: 9/29/2021  7:33 PM  ED Room: 23/23          7:52 PM EDT      HPI: Jeremy Moore 41 y.o. male with a past medical history of   Past Medical History:   Diagnosis Date    Hypertension      presents status post right hand injury. The patient states that the injury occurred just prior to arrival. The history is obtained from the patient. Patient states that he accidentally cut his right hand on a table saw. There are lacerations to the right index finger, middle finger, and a small laceration on the ring finger. Patient describes the pain as sharp. Patient states he did not take any medications prior to arrival.  Patient states the pain is worsened by palpation and flexion. Patient has full range of motion and pain with flexion and extension of joint. Patient denies any distal neurologic symptoms including numbness, tingling, paralysis or coolness of the extremity. No other reported injuries or other extremity tenderness or injuries. Patient denies any history of injury or surgery to this extremity. Patient reports that he has not had a tetanus shot within the last 5 years. No evidence of tendon involvement. Review of Systems:   Pertinent positives and negatives are stated within HPI, all other systems reviewed and are negative. Ish Estevez --------------------------------------------- PAST HISTORY ---------------------------------------------  Past Medical History:  has a past medical history of Hypertension. Past Surgical History:  has a past surgical history that includes Neck surgery; Hernia repair; Finger surgery (Left); and hernia repair. Social History:  reports that he has been smoking cigarettes. He has a 30.00 pack-year smoking history. He has quit using smokeless tobacco. He reports current alcohol use. Family History: family history includes COPD in his father; Hypertension in his father.      The patients -------------------------------------------------- RESULTS -------------------------------------------------  I have personally reviewed all laboratory and imaging results for this patient. Results are listed below. LABS:  No results found for this visit on 09/29/21. RADIOLOGY:  Interpreted by Radiologist.  XR HAND RIGHT (MIN 3 VIEWS)   Final Result   1. Soft tissue irregularity and soft tissue swelling about the right long   finger. Comminuted intra-articular fracture of the middle phalanx of the   right long finger. 2.  Soft tissue irregularity and osseous irregularity to the radial head of   the right index finger proximal phalanx. 3.  Minimal right thumb osteoarthritis.                 ------------------------------ ED COURSE/MEDICAL DECISION MAKING----------------------  Medications   HYDROcodone-acetaminophen (NORCO) 5-325 MG per tablet 1 tablet (1 tablet Oral Given 9/29/21 2023)   tetanus & diphtheria toxoids (adult) 5-2 LFU injection 0.5 mL (0.5 mLs IntraMUSCular Given 9/29/21 2021)   ceFAZolin (ANCEF) 2000 mg in sterile water 20 mL IV syringe (2,000 mg IntraVENous Given 9/29/21 2148)             Medical decision making: Patient presents to the emergency department today for evaluation of right hand injury following an injury that occurred prior to arrival.   Patient had imaging completed today in emergency department which shows comminuted intra-articular fracture of the middle phalanx. Ancef 2g IV and tetanus administered. Ortho consulted for further evaluation. Patient will be discharged with cephalexin. Reassess  [id number] Ortho at bedside  2242 Patient complaining of increasing pain.           Impression:   laceration    Disposition:  Discharge to home    Condition:  CALEB Soliman CNP  10/01/21 0816       CALEB Fairchild CNP  10/01/21 1027    ATTENDING PROVIDER ATTESTATION:     I have personally performed and/or participated in the history, exam, medical decision making, and procedures and agree with all pertinent clinical information unless otherwise noted. I have also reviewed and agree with the past medical, family and social history unless otherwise noted. I saw this patient face-to-face with the mid level provider      I received this patient at sign out from NP. I have discussed the patient's initial exam, treatment and plan of care with the out going physician. I have introduced my self to the patient / family and have answered their questions to this point. I have examined the patient myself and reviewed ordered tests / medications and  reviewed any available results to this point. If a resident is involved in the Emergency Department care, I have discussed my findings and plan with them as well. Orthopedic surgery at the bedside to evaluate and disposition the patient. We will follow-up with a hand surgeon.            Marta Mann DO  11/12/21 1919

## 2021-09-30 PROCEDURE — 2500000003 HC RX 250 WO HCPCS

## 2021-09-30 RX ORDER — CEPHALEXIN 500 MG/1
500 CAPSULE ORAL 3 TIMES DAILY
Qty: 30 CAPSULE | Refills: 0 | Status: SHIPPED | OUTPATIENT
Start: 2021-09-30 | End: 2021-10-10

## 2021-09-30 RX ORDER — HYDROCODONE BITARTRATE AND ACETAMINOPHEN 5; 325 MG/1; MG/1
1 TABLET ORAL EVERY 6 HOURS PRN
Qty: 6 TABLET | Refills: 0 | Status: SHIPPED | OUTPATIENT
Start: 2021-09-30 | End: 2021-10-03

## 2021-09-30 RX ADMIN — LIDOCAINE HYDROCHLORIDE 20 ML: 10 INJECTION, SOLUTION INFILTRATION; PERINEURAL at 00:03

## 2021-09-30 ASSESSMENT — PAIN SCALES - GENERAL: PAINLEVEL_OUTOF10: 10

## 2021-09-30 NOTE — CONSULTS
Department of Orthopedic Surgery  Resident Consult Note          Reason for Consult: Left finger laceration    HISTORY OF PRESENT ILLNESS:       Patient is a 41 y.o. male who presents with left finger lacerations and hand pain after table saw accident. Patient states he was cutting wood when his hand actually got caught in the sawblade. Patient is currently complaining of pain to the right third fourth and fifth digits. Denies any previous pain in his hand. Denies any decreased range of motion after the injury. Denies any fevers or chills. States he is not up-to-date on his tetanus shot. Denies numbness/tingling/paresthesias. Denies any other orthopedic complaints at this time. Past Medical History:        Diagnosis Date    Hypertension      Past Surgical History:        Procedure Laterality Date    FINGER SURGERY Left     left thumb    HERNIA REPAIR      HERNIA REPAIR      inguinal age 4    NECK SURGERY       Current Medications:   No current facility-administered medications for this encounter. Allergies:  Patient has no known allergies. Social History:   TOBACCO:   reports that he has been smoking cigarettes. He has a 30.00 pack-year smoking history. He has quit using smokeless tobacco.  ETOH:   reports current alcohol use. DRUGS:   has no history on file for drug use.   ACTIVITIES OF DAILY LIVING:    OCCUPATION:    Family History:       Problem Relation Age of Onset    Hypertension Father     COPD Father        REVIEW OF SYSTEMS:  CONSTITUTIONAL:  negative for  fevers, chills  EYES:  negative for acute vision changes  HEENT:  negative for cough, hearing loss  RESPIRATORY:  negative for  dyspnea, wheezing  CARDIOVASCULAR:  negative for  chest pain  GASTROINTESTINAL:  negative for nausea, vomiting  GENITOURINARY:  negative for frequency, urinary incontinence  HEMATOLOGIC/LYMPHATIC:  negative for bleeding  MUSCULOSKELETAL:  positive for  pain  NEUROLOGICAL:  negative for headaches, dizziness  BEHAVIOR/PSYCH:  negative for increased agitation and anxiety    PHYSICAL EXAM:    VITALS:  BP (!) 144/89   Pulse 86   Temp 98.7 °F (37.1 °C) (Infrared)   Resp 19   Ht 6' (1.829 m)   Wt 195 lb (88.5 kg)   SpO2 98%   BMI 26.45 kg/m²   CONSTITUTIONAL:  awake, alert, cooperative, no apparent distress, and appears stated age  MUSCULOSKELETAL:  Right upper Extremity:  Skin laceration to dorsal aspect of the right second digit longitudinally oriented approximately 2 cm. Exposed subcutaneous tissue. Laceration to the dorsal aspect of the right third digit horizontally oriented approximately 1/2 cm. Exposed subcutaneous tissue. Small superficial laceration horizontally oriented on the right fourth finger. Does not probe to subcutaneous tissue. Remainder of the skin exam is noncontributory. TTP to the right second third and fourth fingers. Tenderness palpation about the hand, wrist, forearm, elbow, arm, shoulder. Sensation intact to light touch on the radial, ulnar, and finger pads of all digits. Globally sensation intact in median, ulnar, radial nerve distributions distally. Motor function grossly intact distally in AIN, PIN, radial, ulnar, median nerve distributions. Patient able to actively extend all digits. Compartment soft and compressible  +2/4 radial and ulnar pulses, hand warm and well-perfused. Capillary refill in all 5 digits less than 2 seconds. Secondary Exam:   leftUE: No obvious signs of trauma. -TTP to fingers, hand, wrist, forearm, elbow, humerus, shoulder or clavicle. bilateralLE: No obvious signs of trauma. -TTP to foot, ankle, leg, knee, thigh, hip.     Pelvis: -TTP, -Log roll, -Heel strike     DATA:    CBC:   Lab Results   Component Value Date    WBC 9.3 08/24/2021    RBC 5.06 08/24/2021    HGB 15.6 08/24/2021    HCT 45.7 08/24/2021    MCV 90.3 08/24/2021    MCH 30.8 08/24/2021    MCHC 34.1 08/24/2021    RDW 11.9 08/24/2021     08/24/2021    MPV 9.8 08/24/2021     PT/INR:  No results found for: PROTIME, INR    Radiology Review:  XR 3 views of the right hand demonstrating a comminuted midshaft fracture of the right third digit. Associated soft tissue disturbance noted on this. Fracture dislocations noted. IMPRESSION:  Open fracture of the right third digit with associated soft tissue laceration  Laceration to the right second and fourth digits    Procedure: The patient's 2nd and 3rd digits were  anesthetized with lidocaine using a digital block. The wound was irrigated with betadine and normal saline. The wound probed to subcutaneous tissues. The wounds were then primarly closed with 3-0 nylon sutures. He was placed in a dorsal blocking splint after xeroform, gauze and kerlex was applied. Patient tolerated the procedure well. PLAN:  Nonweightbearing right upper extremity, dorsal blocking splint placed. Pain control per ED  Antibiotics 2 g of intravenous Ancef administered in ED, patient sent home on Keflex  Follow up with Dr. Paul Srinivasan in 1 to 2 weeks   All patient/family questions have been answered and patient is currently agreeable to plan.   Discuss with Attending Dr. Paul Srinivasan      Electronically signed by Kate Moya DO on 9/30/2021 at 2:15 AM

## 2021-09-30 NOTE — ED NOTES
Bed: 22  Expected date:   Expected time:   Means of arrival:   Comments:  PT IN [address], RN  09/29/21 8424

## 2021-10-01 ENCOUNTER — OFFICE VISIT (OUTPATIENT)
Dept: ORTHOPEDIC SURGERY | Age: 41
End: 2021-10-01
Payer: COMMERCIAL

## 2021-10-01 VITALS — BODY MASS INDEX: 26.41 KG/M2 | WEIGHT: 195 LBS | TEMPERATURE: 98 F | HEIGHT: 72 IN

## 2021-10-01 DIAGNOSIS — S62.609B PHALANX, HAND FRACTURE, OPEN, INITIAL ENCOUNTER: ICD-10-CM
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
## 2021-10-01 PROCEDURE — 99203 OFFICE O/P NEW LOW 30 MIN: CPT | Performed by: ORTHOPAEDIC SURGERY

## 2021-10-01 PROCEDURE — 29125 APPL SHORT ARM SPLINT STATIC: CPT | Performed by: ORTHOPAEDIC SURGERY

## 2021-10-01 RX ORDER — QUETIAPINE FUMARATE 25 MG/1
TABLET, FILM COATED ORAL
COMMUNITY
Start: 2021-09-20

## 2021-10-01 RX ORDER — ARIPIPRAZOLE 5 MG/1
TABLET ORAL
COMMUNITY
Start: 2021-09-20

## 2021-10-01 RX ORDER — ACETAMINOPHEN AND CODEINE PHOSPHATE 300; 30 MG/1; MG/1
TABLET ORAL
COMMUNITY
Start: 2021-08-17 | End: 2021-10-01

## 2021-10-01 RX ORDER — HYDROCODONE BITARTRATE AND ACETAMINOPHEN 5; 325 MG/1; MG/1
1 TABLET ORAL EVERY 6 HOURS PRN
Qty: 21 TABLET | Refills: 0 | Status: SHIPPED | OUTPATIENT
Start: 2021-10-03 | End: 2021-10-08

## 2021-10-01 NOTE — PROGRESS NOTES
Chief Complaint   Patient presents with    Hand Pain     left middle laceration DOI 9/29. using a hand saw         HPI:    Patient is 41 y.o. male presenting today with left long finger laceration and hand injury on 929. Patient was using hand saw when he his hand engaged sawblade. Patient admits to injury mostly of his long finger but also admits to injuries to his index and ring finger. Patient states he has sensation but continues to have pain. Previous treatments include irrigation and laceration repair in the emergency department and antibiotics. Denies injury elsewhere. ROS:    Skin: (-) rash,(-) psoriasis,(-) eczema, (-)skin cancer. Neurologic: (-)numbness, (-)tingling, (-)headaches, (-) LOC. Cardiovascular: (-) Chest pain, (-) swelling in legs/feet, (-) SOB, (-) cramping in legs/feet with walking. All other review of systems negative except stated above or in HPI      Past Medical History:   Diagnosis Date    Hypertension      Past Surgical History:   Procedure Laterality Date    FINGER SURGERY Left     left thumb    HERNIA REPAIR      HERNIA REPAIR      inguinal age 9    NECK SURGERY         Current Outpatient Medications:     ARIPiprazole (ABILIFY) 5 MG tablet, take 1 tablet by mouth at bedtime, Disp: , Rfl:     QUEtiapine (SEROQUEL) 25 MG tablet, take 1 tablet by mouth four times a day if needed, Disp: , Rfl:     [START ON 10/3/2021] HYDROcodone-acetaminophen (NORCO) 5-325 MG per tablet, Take 1 tablet by mouth every 6 hours as needed for Pain for up to 5 days. , Disp: 21 tablet, Rfl: 0    HYDROcodone-acetaminophen (NORCO) 5-325 MG per tablet, Take 1 tablet by mouth every 6 hours as needed for Pain for up to 3 days. , Disp: 6 tablet, Rfl: 0    cephALEXin (KEFLEX) 500 MG capsule, Take 1 capsule by mouth 3 times daily for 10 days, Disp: 30 capsule, Rfl: 0    divalproex (DEPAKOTE) 500 MG DR tablet, Take 1 tablet by mouth every 12 hours, Disp: 60 tablet, Rfl: 0    nicotine polacrilex (NICORETTE) 4 MG gum, Take 1 each by mouth as needed for Smoking cessation, Disp: 110 each, Rfl: 3    OLANZapine (ZYPREXA) 15 MG tablet, Take 1 tablet by mouth nightly, Disp: 30 tablet, Rfl: 0  No Known Allergies  Social History     Socioeconomic History    Marital status:      Spouse name: Not on file    Number of children: Not on file    Years of education: Not on file    Highest education level: Not on file   Occupational History    Not on file   Tobacco Use    Smoking status: Current Every Day Smoker     Packs/day: 1.50     Years: 20.00     Pack years: 30.00     Types: Cigarettes    Smokeless tobacco: Former User   Substance and Sexual Activity    Alcohol use: Yes     Comment: occ    Drug use: Not on file    Sexual activity: Not on file   Other Topics Concern    Not on file   Social History Narrative    Not on file     Social Determinants of Health     Financial Resource Strain:     Difficulty of Paying Living Expenses:    Food Insecurity:     Worried About [address] in the Last Year:     Ran Out of Food in the Last Year:    Transportation Needs:     Lack of Transportation (Medical):      Lack of Transportation (Non-Medical):    Physical Activity:     Days of Exercise per Week:     Minutes of Exercise per Session:    Stress:     Feeling of Stress :    Social Connections:     Frequency of Communication with Friends and Family:     Frequency of Social Gatherings with Friends and Family:     Attends Hinduism Services:     Active Member of Clubs or Organizations:     Attends Club or Organization Meetings:     Marital Status:    Intimate Partner Violence:     Fear of Current or Ex-Partner:     Emotionally Abused:     Physically Abused:     Sexually Abused:      Family History   Problem Relation Age of Onset    Hypertension Father     COPD Father            Physical Exam:    Temp 98 °F (36.7 °C)   Ht 6' (1.829 m)   Wt 195 lb (88.5 kg)   BMI 26.45 kg/m²     GENERAL: alert, appears stated age, cooperative, no acute distress    HEENT: Head is normocephalic, atraumatic. PERRLA. SKIN: Clean, dry, intact. There is not any cellulitis or cutaneous lesions noted in the lower extremities except noted below in MSK    PULMONARY: breathing is regular and unlabored, no acute distress    CV: The bilateral upper and lower extremities are warm and well-perfused with brisk capillary refill. 2+ pulses UE and LE bilateral.     PSYCHIATRY: Pleasant mood, appropriate behavior, follows commands    NEURO: Sensation is intact distally with light touch with no alteration. Motor exam of the lower extremities show quadriceps, hamstrings, foot dorsiflexion and plantarflexion grossly intact 5/5. LYMPH: No lymphedema present distally in upper or lower extrimity. MUSCULOSKELETAL:  Gait: Patient walks around the exam room and in the hallway with a normal gait and no limp. Balance and coordination are age appropriate. Shoulder Exam:  Full range of motion without pain. Elbow Exam:  Full painless range of motion. No effusion. No tenderness to palpation. Wrist/Hand Exam: Examination of left hand reveals injury to the left long finger, left ring finger, left index finger with lacerations noted as well as sutures intact. No signs of infection. Lacerations are well approximated. Tender to palpation along fracture site. There is notable but subtle rotational deformity of the long finger but more noticeably at the index finger with pain along the PIP joint and with stressing varus valgus.         Imaging:  XR HAND RIGHT (MIN 3 VIEWS)    Result Date: 9/29/2021  EXAMINATION: THREE XRAY VIEWS OF THE RIGHT HAND 9/29/2021 7:53 pm COMPARISON: Right hand series from April 16, 2011 HISTORY: ORDERING SYSTEM PROVIDED HISTORY: pain, cut with saw TECHNOLOGIST PROVIDED HISTORY: Reason for exam:->pain, cut with saw FINDINGS: Soft tissue irregularity and soft tissue swelling about the right index and long fingers. There is a cortical irregularity to the radial head of the right index finger proximal phalanx. Comminuted fracture of the middle phalanx of the right long finger with suspected extension to the PIP joint. No additional fractures seen. Osseous mineralization is normal.  Minimal right thumb CMC joint degenerative spurring. 1.  Soft tissue irregularity and soft tissue swelling about the right long finger. Comminuted intra-articular fracture of the middle phalanx of the right long finger. 2.  Soft tissue irregularity and osseous irregularity to the radial head of the right index finger proximal phalanx. 3.  Minimal right thumb osteoarthritis. Amanda Bradshaw was seen today for hand pain. Diagnoses and all orders for this visit:    Contact with powered saw as cause of accidental injury  -     HYDROcodone-acetaminophen (NORCO) 5-325 MG per tablet; Take 1 tablet by mouth every 6 hours as needed for Pain for up to 5 days. -     Jessee Solomon MD, Orthopaedics (hand & upper extremities), [place]    Phalanx, hand fracture, open, initial encounter  -     Jsesee Solomon MD, Orthopaedics (hand & upper extremities), [address]        Patient seen and examined. X-rays reviewed with patient in detail. Natural history and course discussed with patient in long discussion  Treatment options discussed with patient in detail including risks and benefits. Patient had obvious injury to middle phalanx of left long finger with only mild deformity. Fracture pattern shows comminuted but was a well aligned fracture. There is mild rotational deformity which is correctable. However patient shows more scissoring of the index finger with pain with varus valgus stressing. Patient appears to have may also have sustained injury to the PIP joint of the index finger.   Due to the complexity of the fracture as well as possible ligamentous injury, patient will be referred to hand orthopedic surgery for evaluation management. Patient was provided a new well-padded molded fiberglass splint as well. Claudio French,   10/1/21        This dictation was performed with a verbal recognition program [facility]) and it was checked for errors. It is possible that there are still dictated errors within this office note. If so, please bring any errors to my attention for an addendum. All efforts were made to ensure that this office note is accurate.

## 2021-10-07 ENCOUNTER — TELEPHONE (OUTPATIENT)
Dept: ORTHOPEDIC SURGERY | Age: 41
End: 2021-10-07

## 2021-10-07 NOTE — TELEPHONE ENCOUNTER
Left voicemail for patient that Dr. Gray Albarado would like to see him on Monday. Provided office phone number to call and make appointment.

## 2021-10-11 ENCOUNTER — OFFICE VISIT (OUTPATIENT)
Dept: ORTHOPEDIC SURGERY | Age: 41
End: 2021-10-11
Payer: COMMERCIAL

## 2021-10-11 VITALS — WEIGHT: 195 LBS | BODY MASS INDEX: 26.41 KG/M2 | RESPIRATION RATE: 20 BRPM | HEIGHT: 72 IN

## 2021-10-11 DIAGNOSIS — M79.641 RIGHT HAND PAIN: Primary | ICD-10-CM

## 2021-10-11 DIAGNOSIS — S62.622B DISPLACED FRACTURE OF MIDDLE PHALANX OF RIGHT MIDDLE FINGER, INITIAL ENCOUNTER FOR OPEN FRACTURE: ICD-10-CM

## 2021-10-11 PROCEDURE — 99204 OFFICE O/P NEW MOD 45 MIN: CPT | Performed by: ORTHOPAEDIC SURGERY

## 2021-10-11 NOTE — PROGRESS NOTES
Department of Orthopedic Surgery  [facility] Emmy Mensah MD  History and Physical      CHIEF COMPLAINT: Right middle finger middle phalanx injury    HISTORY OF PRESENT ILLNESS:                The patient is a 41 y.o. male who presents with table saw injury to the right hand. He had a laceration to the middle phalanx of the index finger. As well as a laceration over the central slip of the middle finger. He went to the ER and had sutures placed. He states the wound was thoroughly explored. It was irrigated and closed. He was then placed on antibiotics. Followed up with Dr. Araceli Basilio who sent him here for further evaluation. The injury occurred on 9/29/2021. Patient is in school for x-ray tech. Is right-hand dominant. Past Medical History:        Diagnosis Date    Hypertension      Past Surgical History:        Procedure Laterality Date    FINGER SURGERY Left     left thumb    HERNIA REPAIR      HERNIA REPAIR      inguinal age 9    NECK SURGERY       Current Medications:   No current facility-administered medications for this visit. Allergies:  Patient has no known allergies. Social History:   TOBACCO:   reports that he has been smoking cigarettes. He has a 30.00 pack-year smoking history. He has quit using smokeless tobacco.  ETOH:   reports current alcohol use. DRUGS:   has no history on file for drug use.   ACTIVITIES OF DAILY LIVING:    OCCUPATION:    Family History:       Problem Relation Age of Onset    Hypertension Father     COPD Father        REVIEW OF SYSTEMS:  CONSTITUTIONAL: Negative for  fevers, chills and sweats  EYES:  negative for  irritation and redness  HEENT:  negative for  sore throat and hoarseness  RESPIRATORY:  negative for  wheezing and hemoptysis  CARDIOVASCULAR:  negative for  orthopnea, edema  MUSCULOSKELETAL:  positive for  pain right index and middle fingers  NEUROLOGICAL:  negative for numbness and tingling    PHYSICAL EXAM:    VITALS:  Resp 20   Ht 6' (1.829 m)   Wt 195 lb (88.5 kg)   BMI 26.45 kg/m²   CONSTITUTIONAL:  awake, alert, cooperative, no apparent distress, and appears stated age  EYES:  Lids and lashes normal, pupils equal, round and reactive to light, extra ocular muscles intact, sclera clear, conjunctiva normal  ENT:  Normocephalic, without obvious abnormality, atraumatic, sinuses nontender on palpation, external ears without lesions, oral pharynx with moist mucus membranes  NECK:  supple, symmetrical, trachea midline  LUNGS:  no increased work of breathing  CARDIOVASCULAR:  no edema  NEUROLOGIC:  Awake, alert, oriented to name, place and time. Cranial nerves II-XII are grossly intact. Motor is 5 out of 5 bilaterally. MUSCULOSKELETAL:  Right upper extremity  Laceration over the PIP joint. Of the index finger. Sutures in place skin well approximated. Negative Matt's test.  He is able to flex and extend the DIP, PIP, MCP joint of the index finger with some stiffness. There is a laceration over the middle phalanx and DIP joint of the middle finger. Decreased range of motion at the DIP and PIP. Sensations intact light touch in the radial and ulnar digital nerves to the index and middle fingers. Tenderness to palpation over the middle phalanx of the middle finger. Remainder of the hand is neurovascularly intact and full flexion extension at the DIP, PIP, MCP joint of all of his other digits. He does have some scissoring of the index and middle finger. DATA:    CBC:   Lab Results   Component Value Date    WBC 9.3 08/24/2021    RBC 5.06 08/24/2021    HGB 15.6 08/24/2021    HCT 45.7 08/24/2021    MCV 90.3 08/24/2021    MCH 30.8 08/24/2021    MCHC 34.1 08/24/2021    RDW 11.9 08/24/2021     08/24/2021    MPV 9.8 08/24/2021     PT/INR:  No results found for: PROTIME, INR    Radiology Review: X-ray of the right hand obtained and reviewed in office today.   3 views right hand demonstrating a comminuted fracture through the middle phalanx of the middle finger. There is a nondisplaced fracture through the base of the middle phalanx of the index finger as well. No other fractures or dislocations are noted    Impression comminuted middle phalanx fracture middle finger and nondisplaced fracture of the index finger middle phalanx. IMPRESSION:  · Right open middle phalanx fracture middle finger  · Right open middle phalanx fracture index finger  · Hypertension  · Previous hernia surgery. PLAN:  Discussed with patient that he has current rotational deformity of the middle phalanx of the middle finger would recommend irrigation debridement with percutaneous pin fixation versus open reduction internal fixation. Also exploration of his extensor tendon of the index and middle finger. He is in agreement and would like to proceed. I have explained the risks and complications of the recommended surgery with the patient at length, as well as discussed potential treatment alternatives including nonoperative management. These risks include but are not limited to death or complication from anesthesia, continued pain, nerve tendon or vascular injury, infection, hematoma, deep vein thrombosis or pulmonary embolism, and need for further surgery, etc. Patient understood this, asked appropriate questions, which were all answered, and elected to proceed with the procedure. I have seen and evaluated the patient and agree with the above assessment and plan on today's visit. I have performed the key components of the history and physical examination with significant findings of right index and middle finger open fractures from table saw. Plan for surgical debridement, ORIF, and repairs as needed. I concur with the findings and plan as documented.       I explained the risks, benefits, alternatives and complications of surgery with the patient including but not limited to the risks of death, possible damage to nerves, vessels, or tendons, possible infection, possible nonunion, possible malunion, possible hardware failure, possible need for hardware removal, stiffness, as well as the possible need further surgery and unanticipated complications. The patient voiced understanding and all questions were answered. The patient elected to proceed with surgical intervention.        Bebe Anna MD  10/11/2021

## 2021-10-12 ENCOUNTER — PREP FOR PROCEDURE (OUTPATIENT)
Dept: ORTHOPEDIC SURGERY | Age: 41
End: 2021-10-12

## 2021-10-12 RX ORDER — SODIUM CHLORIDE 9 MG/ML
25 INJECTION, SOLUTION INTRAVENOUS PRN
Status: CANCELLED | OUTPATIENT
Start: 2021-10-12

## 2021-10-12 RX ORDER — SODIUM CHLORIDE 0.9 % (FLUSH) 0.9 %
5-40 SYRINGE (ML) INJECTION EVERY 12 HOURS SCHEDULED
Status: CANCELLED | OUTPATIENT
Start: 2021-10-12

## 2021-10-12 RX ORDER — SODIUM CHLORIDE 9 MG/ML
INJECTION, SOLUTION INTRAVENOUS CONTINUOUS
Status: CANCELLED | OUTPATIENT
Start: 2021-10-12

## 2021-10-12 RX ORDER — SODIUM CHLORIDE 0.9 % (FLUSH) 0.9 %
5-40 SYRINGE (ML) INJECTION PRN
Status: CANCELLED | OUTPATIENT
Start: 2021-10-12

## 2021-10-13 ENCOUNTER — HOSPITAL ENCOUNTER (OUTPATIENT)
Dept: GENERAL RADIOLOGY | Age: 41
Discharge: HOME | End: 2021-10-15
Attending: ORTHOPAEDIC SURGERY
Payer: COMMERCIAL

## 2021-10-13 ENCOUNTER — HOSPITAL ENCOUNTER (OUTPATIENT)
Age: 41
Setting detail: OUTPATIENT SURGERY
Discharge: HOME | End: 2021-10-13
Attending: ORTHOPAEDIC SURGERY | Admitting: ORTHOPAEDIC SURGERY
Payer: COMMERCIAL

## 2021-10-13 ENCOUNTER — ANESTHESIA (OUTPATIENT)
Dept: OPERATING ROOM | Age: 41
End: 2021-10-13
Payer: COMMERCIAL

## 2021-10-13 ENCOUNTER — ANESTHESIA EVENT (OUTPATIENT)
Dept: OPERATING ROOM | Age: 41
End: 2021-10-13
Payer: COMMERCIAL

## 2021-10-13 VITALS
RESPIRATION RATE: 16 BRPM | HEART RATE: 66 BPM | BODY MASS INDEX: 26.41 KG/M2 | SYSTOLIC BLOOD PRESSURE: 134 MMHG | TEMPERATURE: 97.6 F | DIASTOLIC BLOOD PRESSURE: 84 MMHG | WEIGHT: 195 LBS | OXYGEN SATURATION: 96 % | HEIGHT: 72 IN

## 2021-10-13 VITALS — OXYGEN SATURATION: 99 % | SYSTOLIC BLOOD PRESSURE: 109 MMHG | DIASTOLIC BLOOD PRESSURE: 60 MMHG

## 2021-10-13 DIAGNOSIS — R52 PAIN: ICD-10-CM

## 2021-10-13 DIAGNOSIS — G89.18 POST-OPERATIVE PAIN: Primary | ICD-10-CM

## 2021-10-13 LAB — SARS-COV-2, NAAT: NOT DETECTED

## 2021-10-13 PROCEDURE — 87205 SMEAR GRAM STAIN: CPT

## 2021-10-13 PROCEDURE — 7100000010 HC PHASE II RECOVERY - FIRST 15 MIN: Performed by: ORTHOPAEDIC SURGERY

## 2021-10-13 PROCEDURE — 87206 SMEAR FLUORESCENT/ACID STAI: CPT

## 2021-10-13 PROCEDURE — 11012 DEB SKIN BONE AT FX SITE: CPT | Performed by: ORTHOPAEDIC SURGERY

## 2021-10-13 PROCEDURE — 6370000000 HC RX 637 (ALT 250 FOR IP): Performed by: ANESTHESIOLOGY

## 2021-10-13 PROCEDURE — 2709999900 HC NON-CHARGEABLE SUPPLY: Performed by: ORTHOPAEDIC SURGERY

## 2021-10-13 PROCEDURE — 87075 CULTR BACTERIA EXCEPT BLOOD: CPT

## 2021-10-13 PROCEDURE — 87015 SPECIMEN INFECT AGNT CONCNTJ: CPT

## 2021-10-13 PROCEDURE — 87070 CULTURE OTHR SPECIMN AEROBIC: CPT

## 2021-10-13 PROCEDURE — 3600000012 HC SURGERY LEVEL 2 ADDTL 15MIN: Performed by: ORTHOPAEDIC SURGERY

## 2021-10-13 PROCEDURE — 2500000003 HC RX 250 WO HCPCS: Performed by: ORTHOPAEDIC SURGERY

## 2021-10-13 PROCEDURE — 87635 SARS-COV-2 COVID-19 AMP PRB: CPT

## 2021-10-13 PROCEDURE — 3700000001 HC ADD 15 MINUTES (ANESTHESIA): Performed by: ORTHOPAEDIC SURGERY

## 2021-10-13 PROCEDURE — 87102 FUNGUS ISOLATION CULTURE: CPT

## 2021-10-13 PROCEDURE — 3600000002 HC SURGERY LEVEL 2 BASE: Performed by: ORTHOPAEDIC SURGERY

## 2021-10-13 PROCEDURE — 2500000003 HC RX 250 WO HCPCS: Performed by: NURSE ANESTHETIST, CERTIFIED REGISTERED

## 2021-10-13 PROCEDURE — 3209999900 FLUORO FOR SURGICAL PROCEDURES

## 2021-10-13 PROCEDURE — 26735 TREAT FINGER FRACTURE EACH: CPT | Performed by: ORTHOPAEDIC SURGERY

## 2021-10-13 PROCEDURE — 2580000003 HC RX 258: Performed by: NURSE ANESTHETIST, CERTIFIED REGISTERED

## 2021-10-13 PROCEDURE — 7100000011 HC PHASE II RECOVERY - ADDTL 15 MIN: Performed by: ORTHOPAEDIC SURGERY

## 2021-10-13 PROCEDURE — 6360000002 HC RX W HCPCS: Performed by: NURSE ANESTHETIST, CERTIFIED REGISTERED

## 2021-10-13 PROCEDURE — 6360000002 HC RX W HCPCS: Performed by: PHYSICIAN ASSISTANT

## 2021-10-13 PROCEDURE — 87116 MYCOBACTERIA CULTURE: CPT

## 2021-10-13 PROCEDURE — 26418 REPAIR FINGER TENDON: CPT | Performed by: ORTHOPAEDIC SURGERY

## 2021-10-13 PROCEDURE — 3700000000 HC ANESTHESIA ATTENDED CARE: Performed by: ORTHOPAEDIC SURGERY

## 2021-10-13 RX ORDER — LIDOCAINE HYDROCHLORIDE 10 MG/ML
INJECTION, SOLUTION INFILTRATION; PERINEURAL PRN
Status: DISCONTINUED | OUTPATIENT
Start: 2021-10-13 | End: 2021-10-13 | Stop reason: ALTCHOICE

## 2021-10-13 RX ORDER — OXYCODONE HYDROCHLORIDE AND ACETAMINOPHEN 5; 325 MG/1; MG/1
1 TABLET ORAL ONCE
Status: COMPLETED | OUTPATIENT
Start: 2021-10-13 | End: 2021-10-13

## 2021-10-13 RX ORDER — MIDAZOLAM HYDROCHLORIDE 1 MG/ML
INJECTION INTRAMUSCULAR; INTRAVENOUS PRN
Status: DISCONTINUED | OUTPATIENT
Start: 2021-10-13 | End: 2021-10-13 | Stop reason: SDUPTHER

## 2021-10-13 RX ORDER — SODIUM CHLORIDE 0.9 % (FLUSH) 0.9 %
5-40 SYRINGE (ML) INJECTION EVERY 12 HOURS SCHEDULED
Status: DISCONTINUED | OUTPATIENT
Start: 2021-10-13 | End: 2021-10-13 | Stop reason: HOSPADM

## 2021-10-13 RX ORDER — FENTANYL CITRATE 50 UG/ML
INJECTION, SOLUTION INTRAMUSCULAR; INTRAVENOUS PRN
Status: DISCONTINUED | OUTPATIENT
Start: 2021-10-13 | End: 2021-10-13 | Stop reason: SDUPTHER

## 2021-10-13 RX ORDER — OXYCODONE HYDROCHLORIDE AND ACETAMINOPHEN 5; 325 MG/1; MG/1
1 TABLET ORAL EVERY 6 HOURS PRN
Qty: 28 TABLET | Refills: 0 | Status: SHIPPED | OUTPATIENT
Start: 2021-10-13 | End: 2021-10-20

## 2021-10-13 RX ORDER — CEPHALEXIN 500 MG/1
500 CAPSULE ORAL 4 TIMES DAILY
Qty: 40 CAPSULE | Refills: 0 | Status: SHIPPED | OUTPATIENT
Start: 2021-10-13 | End: 2021-10-23

## 2021-10-13 RX ORDER — SODIUM CHLORIDE 9 MG/ML
INJECTION, SOLUTION INTRAVENOUS CONTINUOUS PRN
Status: DISCONTINUED | OUTPATIENT
Start: 2021-10-13 | End: 2021-10-13 | Stop reason: SDUPTHER

## 2021-10-13 RX ORDER — LIDOCAINE HYDROCHLORIDE 20 MG/ML
INJECTION, SOLUTION EPIDURAL; INFILTRATION; INTRACAUDAL; PERINEURAL PRN
Status: DISCONTINUED | OUTPATIENT
Start: 2021-10-13 | End: 2021-10-13 | Stop reason: SDUPTHER

## 2021-10-13 RX ORDER — SODIUM CHLORIDE 0.9 % (FLUSH) 0.9 %
5-40 SYRINGE (ML) INJECTION PRN
Status: DISCONTINUED | OUTPATIENT
Start: 2021-10-13 | End: 2021-10-13 | Stop reason: HOSPADM

## 2021-10-13 RX ORDER — PROPOFOL 10 MG/ML
INJECTION, EMULSION INTRAVENOUS CONTINUOUS PRN
Status: DISCONTINUED | OUTPATIENT
Start: 2021-10-13 | End: 2021-10-13 | Stop reason: SDUPTHER

## 2021-10-13 RX ORDER — SODIUM CHLORIDE 9 MG/ML
25 INJECTION, SOLUTION INTRAVENOUS PRN
Status: DISCONTINUED | OUTPATIENT
Start: 2021-10-13 | End: 2021-10-13 | Stop reason: HOSPADM

## 2021-10-13 RX ORDER — SODIUM CHLORIDE 9 MG/ML
INJECTION, SOLUTION INTRAVENOUS CONTINUOUS
Status: DISCONTINUED | OUTPATIENT
Start: 2021-10-13 | End: 2021-10-13 | Stop reason: HOSPADM

## 2021-10-13 RX ADMIN — SODIUM CHLORIDE: 9 INJECTION, SOLUTION INTRAVENOUS at 11:53

## 2021-10-13 RX ADMIN — FENTANYL CITRATE 50 MCG: 50 INJECTION, SOLUTION INTRAMUSCULAR; INTRAVENOUS at 12:08

## 2021-10-13 RX ADMIN — FENTANYL CITRATE 50 MCG: 50 INJECTION, SOLUTION INTRAMUSCULAR; INTRAVENOUS at 11:58

## 2021-10-13 RX ADMIN — OXYCODONE HYDROCHLORIDE AND ACETAMINOPHEN 1 TABLET: 5; 325 TABLET ORAL at 13:35

## 2021-10-13 RX ADMIN — MIDAZOLAM 2 MG: 1 INJECTION INTRAMUSCULAR; INTRAVENOUS at 11:53

## 2021-10-13 RX ADMIN — PROPOFOL 50 MG: 10 INJECTION, EMULSION INTRAVENOUS at 12:08

## 2021-10-13 RX ADMIN — PROPOFOL 125 MCG/KG/MIN: 10 INJECTION, EMULSION INTRAVENOUS at 11:58

## 2021-10-13 RX ADMIN — Medication 2000 MG: at 11:58

## 2021-10-13 RX ADMIN — LIDOCAINE HYDROCHLORIDE 80 MG: 20 INJECTION, SOLUTION EPIDURAL; INFILTRATION; INTRACAUDAL; PERINEURAL at 11:58

## 2021-10-13 ASSESSMENT — PULMONARY FUNCTION TESTS
PIF_VALUE: 1
PIF_VALUE: 1
PIF_VALUE: 0
PIF_VALUE: 1
PIF_VALUE: 2
PIF_VALUE: 1
PIF_VALUE: 0
PIF_VALUE: 1
PIF_VALUE: 0
PIF_VALUE: 1
PIF_VALUE: 0
PIF_VALUE: 1
PIF_VALUE: 0
PIF_VALUE: 1
PIF_VALUE: 0
PIF_VALUE: 1
PIF_VALUE: 0
PIF_VALUE: 1
PIF_VALUE: 1

## 2021-10-13 ASSESSMENT — PAIN SCALES - GENERAL
PAINLEVEL_OUTOF10: 6
PAINLEVEL_OUTOF10: 6
PAINLEVEL_OUTOF10: 0
PAINLEVEL_OUTOF10: 6

## 2021-10-13 ASSESSMENT — PAIN - FUNCTIONAL ASSESSMENT
PAIN_FUNCTIONAL_ASSESSMENT: ACTIVITIES ARE NOT PREVENTED
PAIN_FUNCTIONAL_ASSESSMENT: ACTIVITIES ARE NOT PREVENTED
PAIN_FUNCTIONAL_ASSESSMENT: 0-10

## 2021-10-13 ASSESSMENT — PAIN DESCRIPTION - DESCRIPTORS
DESCRIPTORS: DISCOMFORT
DESCRIPTORS: DISCOMFORT

## 2021-10-13 ASSESSMENT — PAIN DESCRIPTION - ONSET
ONSET: ON-GOING
ONSET: ON-GOING

## 2021-10-13 ASSESSMENT — PAIN DESCRIPTION - FREQUENCY
FREQUENCY: CONTINUOUS
FREQUENCY: CONTINUOUS

## 2021-10-13 ASSESSMENT — PAIN DESCRIPTION - PROGRESSION
CLINICAL_PROGRESSION: NOT CHANGED
CLINICAL_PROGRESSION: NOT CHANGED

## 2021-10-13 ASSESSMENT — PAIN DESCRIPTION - ORIENTATION
ORIENTATION: RIGHT
ORIENTATION: RIGHT

## 2021-10-13 ASSESSMENT — PAIN DESCRIPTION - LOCATION
LOCATION: FINGER (COMMENT WHICH ONE)
LOCATION: FINGER (COMMENT WHICH ONE)

## 2021-10-13 ASSESSMENT — LIFESTYLE VARIABLES: SMOKING_STATUS: 1

## 2021-10-13 ASSESSMENT — PAIN DESCRIPTION - PAIN TYPE
TYPE: SURGICAL PAIN
TYPE: SURGICAL PAIN

## 2021-10-13 NOTE — BRIEF OP NOTE
Brief Postoperative Note      Patient: Yann Moscoso  YOB: 1980  MRN: [id number]    Date of Procedure: 10/13/2021    Pre-Op Diagnosis: PHALANX HAND FRACTURE OPEN    Post-Op Diagnosis: Same       Procedure(s):  RIGHT INDEX AND MIDDLE FINGER  INCISION AND DRAINAGE, WITH TENDON REPAIR  RIGHT MIDDLE FINGER PINNING    Surgeon(s):  Shruthi Valdez MD    Assistant:  Physician Assistant: ROSALIE Barraza  Resident: Otho Essex, DO    Anesthesia: Monitor Anesthesia Care    Estimated Blood Loss (mL): Minimal    Complications: None    Specimens:   ID Type Source Tests Collected by Time Destination   1 : right index finger culture Tissue Tissue CULTURE, ANAEROBIC, CULTURE, FUNGUS, GRAM STAIN, CULTURE, SURGICAL, CULTURE WITH SMEAR, ACID FAST [address] Gray Albarado MD 10/13/2021 1214        Implants:  Implant Name Type Inv.  Item Serial No.  Lot No. LRB No. Used Action   K WIRE FIX L6IN DIA0.045IN [[id number]] [facility] [employer]]  K WIRE FIX L6IN DIA0.045IN [[id number]] [[facility] [employer]]  [facility] [employer]  Right 2 Implanted         Drains: * No LDAs found *    Findings: see op note    Electronically signed by Otho Essex, DO on 10/13/2021 at 1:24 PM

## 2021-10-13 NOTE — PROGRESS NOTES
Belongings, wallet and ring locked in locker 107.  Key placed with stage 2 nursing station
CLINICAL PHARMACY NOTE: MEDS TO BEDS    Total # of Prescriptions Filled: 2   The following medications were delivered to the patient:  · Percocet 5-325 mg  · Cephalexin 500 mg    Additional Documentation:
juice only    [] Bring inhalers day of surgery    [] Bring C-PAP/ Bi-Pap day of surgery    [] Bring urine specimen day of surgery    [x] Shower or bath with soap, lather and rinse well, AM of Surgery, no lotion, powders or creams to surgical site    [] Follow bowel prep as instructed per surgeon    [x] No tobacco products within 24 hours of surgery     [x] No alcohol or illegal drug use within 24 hours of surgery.     [x] Jewelry, body piercing's, eyeglasses, contact lenses and dentures are not permitted into surgery (bring cases)      [x] Please do not wear any nail polish, make up or hair products on the day of surgery    [x] You can expect a call the business day prior to procedure to notify you if your arrival time changes    [x] If you receive a survey after surgery we would greatly appreciate your comments    [] Parent/guardian of a minor must accompany their child and remain on the premises  the entire time they are under our care     [] Pediatric patients may bring favorite toy, blanket or comfort item with them    [] A caregiver or family member must remain with the patient during their stay if they are mentally handicapped, have dementia, disoriented or unable to use a call light or would be a safety concern if left unattended    [x] Please notify surgeon if you develop any illness between now and time of surgery (cold, cough, sore throat, fever, nausea, vomiting) or any signs of infections  including skin, wounds, and dental.    [x]  The Outpatient Pharmacy is available to fill your prescription here on your day of surgery, ask your preop nurse for details    [] Other instructions    EDUCATIONAL MATERIALS PROVIDED:    [] PAT Preoperative Education Packet/Booklet     [] Medication List    [] Transfusion bracelet applied with instructions    [] Shower with soap, lather and rinse well, and use CHG wipes provided the evening before surgery as instructed    [] Incentive spirometer with instructions

## 2021-10-13 NOTE — H&P
right index and middle fingers  NEUROLOGICAL:  negative for numbness and tingling     PHYSICAL EXAM:    VITALS:  Resp 20   Ht 6' (1.829 m)   Wt 195 lb (88.5 kg)   BMI 26.45 kg/m²   CONSTITUTIONAL:  awake, alert, cooperative, no apparent distress, and appears stated age  EYES:  Lids and lashes normal, pupils equal, round and reactive to light, extra ocular muscles intact, sclera clear, conjunctiva normal  ENT:  Normocephalic, without obvious abnormality, atraumatic, sinuses nontender on palpation, external ears without lesions, oral pharynx with moist mucus membranes  NECK:  supple, symmetrical, trachea midline  LUNGS:  no increased work of breathing  CARDIOVASCULAR:  no edema  NEUROLOGIC:  Awake, alert, oriented to name, place and time. Cranial nerves II-XII are grossly intact. Motor is 5 out of 5 bilaterally. MUSCULOSKELETAL:  Right upper extremity  Laceration over the PIP joint. Of the index finger. Sutures in place skin well approximated. Negative Matt's test.  He is able to flex and extend the DIP, PIP, MCP joint of the index finger with some stiffness. There is a laceration over the middle phalanx and DIP joint of the middle finger. Decreased range of motion at the DIP and PIP. Sensations intact light touch in the radial and ulnar digital nerves to the index and middle fingers. Tenderness to palpation over the middle phalanx of the middle finger. Remainder of the hand is neurovascularly intact and full flexion extension at the DIP, PIP, MCP joint of all of his other digits.   He does have some scissoring of the index and middle finger.           DATA:    CBC:         Lab Results   Component Value Date     WBC 9.3 08/24/2021     RBC 5.06 08/24/2021     HGB 15.6 08/24/2021     HCT 45.7 08/24/2021     MCV 90.3 08/24/2021     MCH 30.8 08/24/2021     MCHC 34.1 08/24/2021     RDW 11.9 08/24/2021      08/24/2021     MPV 9.8 08/24/2021      PT/INR:  No results found for: PROTIME, INR     Radiology Review: X-ray of the right hand obtained and reviewed in office today. 3 views right hand demonstrating a comminuted fracture through the middle phalanx of the middle finger. There is a nondisplaced fracture through the base of the middle phalanx of the index finger as well. No other fractures or dislocations are noted     Impression comminuted middle phalanx fracture middle finger and nondisplaced fracture of the index finger middle phalanx.     IMPRESSION:  · Right open middle phalanx fracture middle finger  · Right open middle phalanx fracture index finger  · Hypertension  · Previous hernia surgery.           PLAN:  Discussed with patient that he has current rotational deformity of the middle phalanx of the middle finger would recommend irrigation debridement with percutaneous pin fixation versus open reduction internal fixation. Also exploration of his extensor tendon of the index and middle finger. He is in agreement and would like to proceed.     I have explained the risks and complications of the recommended surgery with the patient at length, as well as discussed potential treatment alternatives including nonoperative management. These risks include but are not limited to death or complication from anesthesia, continued pain, nerve tendon or vascular injury, infection, hematoma, deep vein thrombosis or pulmonary embolism, and need for further surgery, etc. Patient understood this, asked appropriate questions, which were all answered, and elected to proceed with the procedure.        I have seen and evaluated the patient and agree with the above assessment and plan on today's visit. I have performed the key components of the history and physical examination with significant findings of right index and middle finger open fractures from table saw. Plan for surgical debridement, ORIF, and repairs as needed.  I concur with the findings and plan as documented.       I explained the risks, benefits, alternatives and complications of surgery with the patient including but not limited to the risks of death, possible damage to nerves, vessels, or tendons, possible infection, possible nonunion, possible malunion, possible hardware failure, possible need for hardware removal, stiffness, as well as the possible need further surgery and unanticipated complications. The patient voiced understanding and all questions were answered. The patient elected to proceed with surgical intervention.         History and Physical Update     Patient was seen and examined. Patient's history and physical was reviewed with the patient. There has been no significant interval changes.       Electronically signed by Vashti Dickerson MD on 10/13/2021 at 10:37 AM

## 2021-10-13 NOTE — ANESTHESIA PRE PROCEDURE
Department of Anesthesiology  Preprocedure Note       Name:  Milady Mitchell   Age:  41 y.o.  :  1980                                          MRN:  [id number]         Date:  10/13/2021      Surgeon: Angel Norris):  Carline Hernandez MD    Procedure: Procedure(s):  RIGHT INDEX AND MIDDLE FINGER  INCISION AND DRAINAGE  RIGHT MIDDLE FINGER PINNING POSSIBLE TENDON REPAIR    Medications prior to admission:   Prior to Admission medications    Medication Sig Start Date End Date Taking? Authorizing Provider   oxyCODONE-acetaminophen (PERCOCET) 5-325 MG per tablet Take 1 tablet by mouth every 6 hours as needed for Pain for up to 7 days.  10/13/21 10/20/21 Yes ROSALIE Dixon   ARIPiprazole (ABILIFY) 5 MG tablet take 1 tablet by mouth at bedtime 21  Yes Historical Provider, MD   QUEtiapine (SEROQUEL) 25 MG tablet qd 21  Yes Historical Provider, MD   nicotine polacrilex (NICORETTE) 4 MG gum Take 1 each by mouth as needed for Smoking cessation 21 95/35/28 Yes Sonido Chang APRN - CNP   divalproex (DEPAKOTE) 500 MG DR tablet Take 1 tablet by mouth every 12 hours 21 73/9/38  Sonido Chang APRN - CNP   OLANZapine (ZYPREXA) 15 MG tablet Take 1 tablet by mouth nightly 21 61/98/61  Sonido Chang APRN - CNP       Current medications:    Current Facility-Administered Medications   Medication Dose Route Frequency Provider Last Rate Last Admin    0.9 % sodium chloride infusion   IntraVENous Continuous PegiridiROSALIE Haas        0.9 % sodium chloride infusion  25 mL IntraVENous PRN ReynaldodiROSALIE Haas        ceFAZolin (ANCEF) 2000 mg in sterile water 20 mL IV syringe  2,000 mg IntraVENous On Call to [address] ROSALIE [address]        sodium chloride flush 0.9 % injection 5-40 mL  5-40 mL IntraVENous 2 times per day EspvalentinodiROSALIE Haas        sodium chloride flush 0.9 % injection 5-40 mL  5-40 mL IntraVENous PRN Espiridion ROSALIE Ojeda           Allergies:  No Known Allergies    Problem List:    Patient Active Problem List   Diagnosis Code    [facility] affective disorder, depressed, severe, with psychotic behavior [facility] F31.5       Past Medical History:        Diagnosis Date    Anxiety     Bipolar 1 disorder ([facility]     Depression     Hypertension     Laceration of finger     right index and middle finger       Past Surgical History:        Procedure Laterality Date    FINGER SURGERY Left     left thumb    HERNIA REPAIR      HERNIA REPAIR      inguinal age 9    NECK SURGERY         Social History:    Social History     Tobacco Use    Smoking status: Current Every Day Smoker     Packs/day: 0.50     Years: 20.00     Pack years: 10.00     Types: Cigarettes    Smokeless tobacco: Former User   Substance Use Topics    Alcohol use: Not Currently                                Ready to quit: Not Answered  Counseling given: Not Answered      Vital Signs (Current):   Vitals:    10/12/21 0825   Weight: 195 lb (88.5 kg)   Height: 6' (1.829 m)                                              BP Readings from Last 3 Encounters:   09/29/21 (!) 144/89   07/25/19 (!) 138/95       NPO Status:                                                                                 BMI:   Wt Readings from Last 3 Encounters:   10/12/21 195 lb (88.5 kg)   10/11/21 195 lb (88.5 kg)   10/01/21 195 lb (88.5 kg)     Body mass index is 26.45 kg/m².     CBC:   Lab Results   Component Value Date    WBC 9.3 08/24/2021    RBC 5.06 08/24/2021    HGB 15.6 08/24/2021    HCT 45.7 08/24/2021    MCV 90.3 08/24/2021    RDW 11.9 08/24/2021     08/24/2021       CMP:   Lab Results   Component Value Date     08/24/2021    K 4.2 08/24/2021     08/24/2021    CO2 28 08/24/2021    BUN 10 08/24/2021    CREATININE 0.9 08/24/2021    GFRAA >60 08/24/2021    LABGLOM >60 08/24/2021    GLUCOSE 98 08/24/2021    PROT 7.6 08/24/2021    CALCIUM 9.5 08/24/2021    BILITOT 0.3 08/24/2021    ALKPHOS 99 08/24/2021    AST 18 08/24/2021    ALT 32 08/24/2021       POC Tests: No results for input(s): POCGLU, POCNA, POCK, POCCL, POCBUN, POCHEMO, POCHCT in the last 72 hours. Coags: No results found for: PROTIME, INR, APTT    HCG (If Applicable): No results found for: PREGTESTUR, PREGSERUM, HCG, HCGQUANT     ABGs: No results found for: PHART, PO2ART, TKM4XBG, MHC1EKL, BEART, W7HKPEGY     Type & Screen (If Applicable):  No results found for: LABABO, LABRH    Drug/Infectious Status (If Applicable):  Lab Results   Component Value Date    HEPCAB NON REACT 05/20/2011       COVID-19 Screening (If Applicable):   Lab Results   Component Value Date    COVID19 Not Detected 10/13/2021    COVID19 NOT DETECTED 08/24/2021           Anesthesia Evaluation  Patient summary reviewed and Nursing notes reviewed no history of anesthetic complications:   Airway: Mallampati: II  TM distance: >3 FB   Neck ROM: full  Mouth opening: > = 3 FB Dental:      Comment: Remaining teeth are chipped    Pulmonary:normal exam    (+) current smoker          Patient smoked on day of surgery. Cardiovascular:  Exercise tolerance: good (>4 METS),   (+) hypertension:,                   Neuro/Psych:   (+) psychiatric history:            GI/Hepatic/Renal: Neg GI/Hepatic/Renal ROS            Endo/Other: Negative Endo/Other ROS                    Abdominal:             Vascular: negative vascular ROS. Other Findings:             Anesthesia Plan      MAC     ASA 2       Induction: intravenous. Anesthetic plan and risks discussed with patient.       Plan discussed with CRNA and surgical team.                  Kris Sanchez MD   10/13/2021

## 2021-10-13 NOTE — ANESTHESIA POSTPROCEDURE EVALUATION
Department of Anesthesiology  Postprocedure Note    Patient: Markus Rodriguez  MRN: [id number]  YOB: 1980  Date of evaluation: 10/13/2021  Time:  5:31 PM     Procedure Summary     Date: 10/13/21 Room / Location: [place] [address] / [address] [place] [address]    Anesthesia Start: 1153 Anesthesia Stop: 1303    Procedures:       RIGHT INDEX AND MIDDLE FINGER  INCISION AND DRAINAGE, WITH TENDON REPAIR (Right Fingers)      RIGHT MIDDLE FINGER PINNING (Right Fingers) Diagnosis: (PHALANX HAND FRACTURE OPEN)    Surgeons: Marco Antonio Hernandez MD Responsible Provider: Harmony Chaney MD    Anesthesia Type: MAC ASA Status: 2          Anesthesia Type: MAC    Abimael Phase I: Abimael Score: 10    Abimael Phase II: Abimael Score: 10    Last vitals: Reviewed and per EMR flowsheets.        Anesthesia Post Evaluation    Patient location during evaluation: PACU  Patient participation: complete - patient participated  Level of consciousness: awake and alert  Pain score: 4  Airway patency: patent  Nausea & Vomiting: no vomiting and no nausea  Complications: no  Cardiovascular status: hemodynamically stable  Respiratory status: spontaneous ventilation  Hydration status: stable

## 2021-10-14 LAB — GRAM STAIN ORDERABLE: NORMAL

## 2021-10-14 NOTE — OP NOTE
[address] [address]                                OPERATIVE REPORT    PATIENT NAME: Liseth Hollins                      :        1980  MED REC NO:   [id number]                            ROOM:  ACCOUNT NO:   [de-identified]                           ADMIT DATE: 10/13/2021  PROVIDER:     Olvin Bell MD    DATE OF PROCEDURE:  10/13/2021    PREOPERATIVE DIAGNOSIS:  Table saw injury to right index and middle  fingers with open fractures and tendon injuries. POSTOPERATIVE DIAGNOSES:  1. Right index finger laceration with injury of the radial lateral band  and triangular ligament. 2.  Right index finger open fracture with intra-articular extension  involving the dorsal radial aspect of the middle phalanx. 3.  Comminuted right middle finger open middle phalanx fracture. 4.  Partial laceration of terminal tendon middle finger involving  approximately 30% of the tendon. PROCEDURES PERFORMED:  1. Excisional debridement of right index and middle finger open  fractures of middle phalanxes  2. Right index finger radial and lateral band and triangular ligament  repair. 3.  Right middle finger partial laceration of terminal tendon repair. 4.  Right middle finger open reduction and percutaneous pin fixation of  comminuted middle phalanx fracture. SURGEON:  Olvin Bell M.D. ANESTHESIA:  1. Monitored anesthesia care. 2.  Local anesthetic by surgeon consisting of approximately 10 mL of 1%  lidocaine plain, digital block type fashion to both the index and middle  fingers. ESTIMATED BLOOD LOSS:  Minimal.    TOTAL TOURNIQUET TIME:  Approximately 30 minutes at 250 mmHg of brachial  tourniquet. ASSISTANTS:  1. Eusebia Culver, physician assistant certified. She was present  throughout the procedure.   Her assistance was used for preoperative  positioning, intraoperative retraction, closure, and dressing  application. Her assistance expedited the case and decreased the  surgical time. 111 Roland Da Silva DO, Orthopedic Surgery Resident. FINDINGS:  1. Index finger was explored. There was a comminuted fracture  involving the dorsal radial aspect of the middle phalanx with  intra-articular extension to the level of the proximal interphalangeal  joint. Overlying radial lateral band was lacerated. The central slip  was intact. There was disruption of the triangular ligament. 2.  Status post excisional debridement of index finger middle phalanx  fracture, the remaining bone was stable and the ligament and radial  lateral band was amenable to repair using Monocryl suture. 3.  There was a partial laceration of the terminal tendon to the middle  finger. This involved approximately 30% of the tendon. Status post  fracture debridement and pin fixation, this was amenable to Monocryl  repair. SPECIMENS:  Excised tissue was sent for culture. DISPOSITION:  The patient remained stable throughout the procedure. OPERATIVE INDICATIONS:  The patient is a very pleasant 75-year-old  gentleman who sustained an injury to his right index and middle fingers. He was seen in the office and found to have significantly comminuted  unstable fracture pattern to the middle finger as well as a fracture  consistent with open fracture of both the left index and middle fingers  as well as possible fracture to the index finger middle phalanx. After  discussion of both surgical and nonsurgical treatment, he wished to  proceed with surgical intervention. Risks include, but not limited to  risk of infection, damage to nerves, vessels, or tendons, malunion,  nonunion, symptomatic hardware, need for hardware removal, stiffness,  loss of range of motion, need for therapy, additional surgery, and  unforeseen complications. He understood and wished to proceed.     DESCRIPTION OF PROCEDURE:  The patient was identified in the holding  area. The right index and middle fingers were identified as the  surgical sites. He was then seen by Anesthesia, taken to the operating  room, placed supine on the table, and underwent monitored anesthesia  care per Anesthesia Department. All bony prominences were well padded. A well-padded arm tourniquet was placed. The right upper extremity was  prepared and draped in the standard sterile fashion. Arm was  exsanguinated. Tourniquet was inflated to 250 mmHg. Total tourniquet  time was approximately 30 minutes. His previous sutures were removed to both the index and middle fingers. The index finger was explored. A 15-blade scalpel was used to excise  devitalized skin, subcutaneous tissues, tendon, and bony fracture  fragments. The extensor mechanism was identified proximally and  distally. The triangular ligament was disrupted. The radial lateral  band was involved at the fracture site. The fracture extended into the  joint. The remaining bone was healthy and viable status post  debridement. The wound was copiously irrigated out. The radial lateral band and triangular ligament were approximated using  0 Vicryl suture with excellent approximation achieved. Attention was directed towards the middle finger. There was a  transverse laceration across the middle finger dorsal aspect just  proximal to the distal interphalangeal joint. Sutures were removed. The laceration extended proximally over the dorsoradial aspect and the  flap was elevated. Excisional debridement was undertaken with a  15-blade scalpel excising skin, subcutaneous tissues, deep fascia, and  loose bony fracture fragments. This was then thoroughly and copiously  irrigated out. There was a partial laceration to the terminal tendon  involving 30% of the tendon itself. The remaining tendon was intact. The fracture was then clearly identified and assessed under fluoroscopy,  it was comminuted.   There was a dorsal unstable fracture fragment that  was amenable to reduction followed by percutaneous pinning with 0.45  K-wires after direct reduction under fluoroscopic imaging. This  stabilized the fracture nicely. K-wires were trimmed and bent and caps  placed. The partial laceration of the terminal tendon was then repaired using  Monocryl suture. At this point, the tourniquet was deflated. There was  healthy brisk cap refill of the index and middle fingers as well as  digits of the hand. Hemostasis was achieved with bipolar cautery. Skin  closed with multiple nylon sutures. Sterile dressing and radial gutter  splint was applied.         Samantha Hurtado MD    D: 10/13/2021 19:09:05       T: 10/13/2021 19:12:08     [email or web address]  Job#: [id number]     Doc#: [id number]    CC:

## 2021-10-17 LAB — CULTURE SURGICAL: NORMAL

## 2021-10-18 LAB — ANAEROBIC CULTURE: NORMAL

## 2021-10-20 ENCOUNTER — TELEPHONE (OUTPATIENT)
Dept: ORTHOPEDIC SURGERY | Age: 41
End: 2021-10-20

## 2021-10-20 DIAGNOSIS — S62.622B DISPLACED FRACTURE OF MIDDLE PHALANX OF RIGHT MIDDLE FINGER, INITIAL ENCOUNTER FOR OPEN FRACTURE: Primary | ICD-10-CM

## 2021-10-21 ENCOUNTER — OFFICE VISIT (OUTPATIENT)
Dept: ORTHOPEDIC SURGERY | Age: 41
End: 2021-10-21

## 2021-10-21 VITALS — BODY MASS INDEX: 26.41 KG/M2 | HEIGHT: 72 IN | RESPIRATION RATE: 18 BRPM | WEIGHT: 195 LBS

## 2021-10-21 DIAGNOSIS — S62.622B DISPLACED FRACTURE OF MIDDLE PHALANX OF RIGHT MIDDLE FINGER, INITIAL ENCOUNTER FOR OPEN FRACTURE: Primary | ICD-10-CM

## 2021-10-21 PROCEDURE — 99024 POSTOP FOLLOW-UP VISIT: CPT | Performed by: ORTHOPAEDIC SURGERY

## 2021-10-21 NOTE — PROGRESS NOTES
HPI:   Patient follows up today 8 days s/p CRPP right middle comminuted middle phalanx fracture. Right index finger extensor tendon repair and right middle finger terminal extensor tendon repair. Doing well postop. no complaints. Pain controlled. Physical Exam:  right index and middle dorsal incision clean, dry, and intact  No signs of infection  Pins intact no signs of loosening. No pin site infections  NVI  Radial, median, and ulnar sensation intact to light touch  Brisk capillary refill    Assessment:  Post-op 8 days s/p CRPP right middle comminuted middle phalanx fracture. Right index finger extensor tendon repair and right middle finger terminal extensor tendon repair. Plan:  Continue with splinting. Physical therapy for range of motion. .  Pain care. Follow-up in 3-4 weeks for x-ray and pin removal..      10/21/2021  Ksenia Hernandez DO    I have seen and evaluated the patient and agree with the above assessment and plan on today's visit. I have performed the key components of the history and physical examination with significant findings of postop doing well. I concur with the findings and plan as documented.     Cynthia Edwards MD  10/21/2021

## 2021-10-25 NOTE — CONSULTS
Department of Orthopedic Surgery  Consult Note              Reason for Consult: Left finger laceration     HISTORY OF PRESENT ILLNESS:                   Patient is a 41 y.o. male who presents with left finger lacerations and hand pain after table saw accident. Patient states he was cutting wood when his hand actually got caught in the sawblade. Patient is currently complaining of pain to the right third fourth and fifth digits. Denies any previous pain in his hand. Denies any decreased range of motion after the injury. Denies any fevers or chills. States he is not up-to-date on his tetanus shot. Denies numbness/tingling/paresthesias. Denies any other orthopedic complaints at this time. Past Medical History:    Past Medical History             Diagnosis Date    Hypertension           Past Surgical History:    Past Surgical History             Procedure Laterality Date    FINGER SURGERY Left       left thumb    HERNIA REPAIR        HERNIA REPAIR         inguinal age 9    NECK SURGERY             Current Medications:   Current Hospital Medications   No current facility-administered medications for this encounter. Allergies:  Patient has no known allergies. Social History:   TOBACCO:   reports that he has been smoking cigarettes. He has a 30.00 pack-year smoking history. He has quit using smokeless tobacco.  ETOH:   reports current alcohol use. DRUGS:   has no history on file for drug use.   ACTIVITIES OF DAILY LIVING:    OCCUPATION:    Family History:   Family History             Problem Relation Age of Onset    Hypertension Father      COPD Father              REVIEW OF SYSTEMS:  CONSTITUTIONAL:  negative for  fevers, chills  EYES:  negative for acute vision changes  HEENT:  negative for cough, hearing loss  RESPIRATORY:  negative for  dyspnea, wheezing  CARDIOVASCULAR:  negative for  chest pain  GASTROINTESTINAL:  negative for nausea, vomiting  GENITOURINARY:  negative for frequency, urinary incontinence  HEMATOLOGIC/LYMPHATIC:  negative for bleeding  MUSCULOSKELETAL:  positive for  pain  NEUROLOGICAL:  negative for headaches, dizziness  BEHAVIOR/PSYCH:  negative for increased agitation and anxiety     PHYSICAL EXAM:    VITALS:  BP (!) 144/89   Pulse 86   Temp 98.7 °F (37.1 °C) (Infrared)   Resp 19   Ht 6' (1.829 m)   Wt 195 lb (88.5 kg)   SpO2 98%   BMI 26.45 kg/m²   CONSTITUTIONAL:  awake, alert, cooperative, no apparent distress, and appears stated age    HEENT: Head is normocephalic, atraumatic. PERRLA.      SKIN: Clean, dry, intact. There is not any cellulitis or cutaneous lesions noted in the lower extremities     PULMONARY: breathing is regular and unlabored, no acute distress     CV: The bilateral lower extremities are warm and well-perfused with brisk capillary refill. 2+ pulses LE bilateral.      PSYCHIATRY: Pleasant mood, appropriate behavior, follows commands     NEURO: Sensation is intact distally with light touch with no alteration. Motor exam of the lower extremities show quadriceps, hamstrings, foot dorsiflexion and plantarflexion grossly intact 5/5. MUSCULOSKELETAL:  Right upper Extremity:  · Skin laceration to dorsal aspect of the right second digit longitudinally oriented approximately 2 cm. Exposed subcutaneous tissue. Laceration to the dorsal aspect of the right third digit horizontally oriented approximately 1/2 cm. Exposed subcutaneous tissue. Small superficial laceration horizontally oriented on the right fourth finger. Does not probe to subcutaneous tissue. Remainder of the skin exam is noncontributory. · TTP to the right second third and fourth fingers. Tenderness palpation about the hand, wrist, forearm, elbow, arm, shoulder. · Sensation intact to light touch on the radial, ulnar, and finger pads of all digits. Globally sensation intact in median, ulnar, radial nerve distributions distally.   · Motor function grossly intact distally in AIN, PIN, radial, ulnar, median nerve distributions. Patient able to actively extend all digits. · Compartment soft and compressible  · +2/4 radial and ulnar pulses, hand warm and well-perfused. Capillary refill in all 5 digits less than 2 seconds. Secondary Exam:   · leftUE: No obvious signs of trauma. -TTP to fingers, hand, wrist, forearm, elbow, humerus, shoulder or clavicle. · bilateralLE: No obvious signs of trauma. -TTP to foot, ankle, leg, knee, thigh, hip. · Pelvis: -TTP, -Log roll, -Heel strike      DATA:    CBC:         Lab Results   Component Value Date     WBC 9.3 08/24/2021     RBC 5.06 08/24/2021     HGB 15.6 08/24/2021     HCT 45.7 08/24/2021     MCV 90.3 08/24/2021     MCH 30.8 08/24/2021     MCHC 34.1 08/24/2021     RDW 11.9 08/24/2021      08/24/2021     MPV 9.8 08/24/2021      PT/INR:  No results found for: PROTIME, INR     Radiology Review:  XR 3 views of the right hand demonstrating a comminuted midshaft fracture of the right third digit. Associated soft tissue disturbance noted on this. Fracture dislocations noted. IMPRESSION:  · Open fracture of the right third digit with associated soft tissue laceration  · Laceration to the right second and fourth digits        PLAN:  · Patient seen and examined. X-rays reviewed. Nonweightbearing right upper extremity, dorsal blocking splint was placed. · Pain control per ED  · Antibiotics 2 g of intravenous Ancef administered in ED, patient sent home on Keflex  · Follow up in clinic  · All patient/family questions have been answered and patient is currently agreeable to plan.

## 2021-11-03 ENCOUNTER — EVALUATION (OUTPATIENT)
Dept: OCCUPATIONAL THERAPY | Age: 41
End: 2021-11-03
Payer: COMMERCIAL

## 2021-11-03 DIAGNOSIS — S62.622B OPEN DISPLACED FRACTURE OF MIDDLE PHALANX OF RIGHT MIDDLE FINGER, INITIAL ENCOUNTER: Primary | ICD-10-CM

## 2021-11-03 PROCEDURE — 97530 THERAPEUTIC ACTIVITIES: CPT | Performed by: OCCUPATIONAL THERAPIST

## 2021-11-03 PROCEDURE — 97140 MANUAL THERAPY 1/> REGIONS: CPT | Performed by: OCCUPATIONAL THERAPIST

## 2021-11-03 PROCEDURE — 97165 OT EVAL LOW COMPLEX 30 MIN: CPT | Performed by: OCCUPATIONAL THERAPIST

## 2021-11-03 NOTE — PROGRESS NOTES
OCCUPATIONAL THERAPY INITIAL EVALUATION    [facility] [place] [facility]LLARY [facility]  [facility] OCCUPATIONAL THERAPY   [place] [address]  Dept: [address] OT Fax: [phone]    Date:  11/3/2021  Initial Evaluation Date: 11/3/2021   Evaluating Therapist: [address], [place]    Patient Name:  Charito Dean    :  1980    Restrictions/Precautions:   Follow extensor protocol and , low fall risk  Diagnosis: S62.622B (ICD-10-CM) - Displaced fracture of middle phalanx of right middle finger, initial encounter for open fracture       Date of Surgery/Injury:     Insurance/Certification information:  [address] of care signed (Y/N): N  Visit# / total visits:     Referring Practitioner:  Britt Alexandre MD  Specific Practitioner Orders: continue with splinting-evaluate and treat  Assessment of current deficits   [] Functional mobility  [x] ADLs  [x] Strength   [] Cognition   [] Functional transfers   [x] IADLs  [] Safety Awareness  [x] Endurance   [x] Fine Motor Coordination  [] Balance  [] Vision/perception  [] Sensation    [] Gross Motor Coordination [x] ROM  [] Pain   [x] Edema    [x] Scar Adhesion/Skin Integrity     OT PLAN OF CARE   OT POC based on physician orders, patient diagnosis and results of clinical assessment    Frequency/Duration 1-2x a week for 12 visits  Specific OT Treatment to include:     [x] Instruction in HEP                   Modalities:  [x] Therapeutic Exercise        [x] Ultrasound               [] Electrical Stimulation/Attended  [x] PROM/Stretching                    [x] Fluidotherapy          [x]  Paraffin                   [x] AAROM  [x] AROM                 [] Iontophoresis:   [x] Tendon Glides                                               [] Neuromuscular Re-Ed            [] ADL/IADL re-training    [x] Therapeutic Activity       [] Pain Management with/without modalities PRN                 [x] Manual Therapy                      [x] Splinting finger     Past Surgical History:   Past Surgical History:   Procedure Laterality Date    FINGER CLOSED REDUCTION Right 10/13/2021    RIGHT MIDDLE FINGER PINNING performed by Mesha Johnson MD at [address] [facility] Left     left thumb    HAND SURGERY Right 10/13/2021    RIGHT INDEX AND MIDDLE FINGER  INCISION AND DRAINAGE, WITH TENDON REPAIR performed by Mesha Johnson MD at [facility]      inguinal age 9    [address]         Reason for Referral: Pt presents this date for initial occupational therapy evaluation s/p surgery on 10/13/2021-right middle comminuted middle phalanx fracture. Right index finger extensor tendon repair and right middle finger terminal extensor tendon repair. Pt states that on 9/29/2021 his hand was cut with a table saw. Pt referred to Eve Teresa MD and surgery was performed on 10/13/2021-detailed below. Surgery:   1. Excisional debridement of right index and middle finger open  fractures of middle phalanxes  2. Right index finger radial and lateral band and triangular ligament  repair. 3.  Right middle finger partial laceration of terminal tendon repair. 4.  Right middle finger open reduction and percutaneous pin fixation of  comminuted middle phalanx fracture. Home Living: home with wife and 3kids (3years old and 3years old)  Prior Level of Function: independent    Cognition:   Alert/Oriented x3     IADL STATUS:   Ind Mod I Min A Mod A Max A Dep Other   Homemaking Responsibility    x      Shopping Responsibility:    x      Mode of Transportation:  x        Leisure & Hobbies:       x   Work:       x     Comments: Pt is not working at this time, pt is staying at home with the kids and helping around the home. Pt needs assistance from his wife to complete heavy household chores and cooking tasks.      ADL STATUS:   Ind Mod I Min A Mod A Max A Dep Other   Feeding:  x        Grooming:  x        Bathing:  x        UE Dressing:  x well. [place] movement issued for HEP to be increase composite fist flexion. Pt states that he is wearing his splint at night and while he is getting dressed for pin protection. Pt verbalized and demonstrated all HEP instructions well to be performed 10 reps each for 3 times a day. Eval Complexity: low  Profile and History- Chart reviewed  Assessment of Occupational Performance and Identification of Deficits- 8   Clinical Decision Making- no additional modifications required    Rehab Potential:                                 [x] Good  [] Fair  [] Poor        Suggested Professional Referral:       [x] No  [] Yes:  Barriers to Goal Achievement[de-identified]          [x] No  [] Yes:  Domestic Concerns:                           [x] No  [] Yes:       Patient. Education:  [x] Plans/Goals, Risks/Benefits discussed  [x] Home exercise program  Method of Education: [x] Verbal  [x] Demo  [x] Written  Comprehension of Education:  [x] Verbalizes understanding. [x] Demonstrates understanding. [] Needs Review. [] Demonstrates/verbalizes understanding of HEP/Ed previously given. Patient understands diagnosis/prognosis and consents to treatment, plan and goals: [x] Yes    [] No     Goal Formulation: Patient  Time In: 8:00            Time Out: 8:45                      Timed Code Treatment Minutes: 25 minutes      CODE  Minutes  Units   11844 OT Eval Low 20 1   84727 OT Eval Medium     77424 OT Eval High     10733 Fluidotherapy     08187 Manual 10 1   04876 Therapeutic Ex     72261 Therapeutic Activity 15 1   10856 ADL/COMP Tech Train     [phone] Neuromuscular Re-Ed     14710 OrthoManagementTraining     [id number] Paraffin     45947 Electrical Stim - Attended     [id number] Iontophoresis     08213 Ultrasound      Other                Electronically signed by: Elba Aadm [place] #[id number]     ORIDALIA'I Certification / Comments      Frequency/Duration 1-2 / week for 18 visits.    Certification period From: 11/3/2021  To: 2/3/2021 I have reviewed the Plan of Care established for skilled therapy services and certify that the services are required and that they will be provided while the patient is under my care. Physician's Comments/Revisions:                   Physicians's Printed Name:  Bucky Corado MD                                   [de-identified] Signature:                                                               Date:      Please review Patient's OT evaluation and if you agree sign/date and fax back to us at our [facility] OT Fax: [phone].  Thank you for your referral!

## 2021-11-10 ENCOUNTER — TREATMENT (OUTPATIENT)
Dept: OCCUPATIONAL THERAPY | Age: 41
End: 2021-11-10
Payer: COMMERCIAL

## 2021-11-10 DIAGNOSIS — S62.622B OPEN DISPLACED FRACTURE OF MIDDLE PHALANX OF RIGHT MIDDLE FINGER, INITIAL ENCOUNTER: Primary | ICD-10-CM

## 2021-11-10 PROCEDURE — 97140 MANUAL THERAPY 1/> REGIONS: CPT | Performed by: OCCUPATIONAL THERAPY ASSISTANT

## 2021-11-10 PROCEDURE — 97110 THERAPEUTIC EXERCISES: CPT | Performed by: OCCUPATIONAL THERAPY ASSISTANT

## 2021-11-10 NOTE — PROGRESS NOTES
OCCUPATIONAL THERAPY PROGRESS NOTE    Date:  11/10/2021  Initial Evaluation Date: 11/3/21    Evaluating Therapist: Ceasar Hale OT    Patient Name:  Yann Moscoso    :  1980  Restrictions/Precautions: Follow extensor protocol and , low fall risk  Diagnosis: S62.622B (ICD-10-CM) - Displaced fracture of middle phalanx of right middle finger, initial encounter for open fracture                                                         Date of Surgery/Injury:      Insurance/Certification information:  [address] of care signed (Y/N): Y through 2/3/2022  Visit# / total visits:      Referring Practitioner:  Charles Malave MD  Specific Practitioner Orders: continue with splinting-evaluate and treat  Assessment of current deficits   []? Functional mobility             [x]? ADLs          [x]? Strength                  []? Cognition   []? Functional transfers           [x]? IADLs         []? Safety Awareness  [x]? Endurance   [x]? Fine Motor Coordination    []? Balance      []? Vision/perception   []? Sensation     []? Gross Motor Coordination [x]? ROM           []? Pain                        [x]? Edema          [x]? Scar Adhesion/Skin Integrity      OT PLAN OF CARE   OT POC based on physician orders, patient diagnosis and results of clinical assessment     Frequency/Duration 1-2x a week for 12 visits  Specific OT Treatment to include:      [x]? Instruction in HEP                   Modalities:  [x]?  Therapeutic Exercise                 [x]? Ultrasound               []? Electrical Stimulation/Attended  [x]? PROM/Stretching                    [x]? Fluidotherapy          [x]?   Paraffin                   [x]? AAROM  [x]?  AROM                 []? Iontophoresis:   [x]? Brad Oliva                                       []? Neuromuscular Re-Ed            []? ADL/IADL re-training    [x]?  Therapeutic Activity                  []? Pain Management with/without modalities PRN                 [x]?  Manual Therapy                      [x]? Splinting                                   [x]? Scar Management                   []?Joint Protection/Training  []? Ergonomics                             [x]?  Joint Mobilization                      []? Adaptive Equipment Assessment/Training                             [x]?  Manual Edema Mobilization   []? Myofascial Release                 []? Energy Conservation/Work Simplification  [x]? GM/FM Coordination                []? Safety retraining/education per  individual diagnosis/goals  []? Desensitization        Patient Specific Goal: To be able to tie his shoes as well as return to fishing                             GOALS (Long term same as Short term):  1) Patient will demonstrate good understanding of home program (exercises/activities/diagnosis/prognosis/goals) with good accuracy. 2) Patient will demonstrate increased active/passive range of motion of their R IF to [facility] for ADL/IADL completion. 3) Patient will demonstrate increased active/passive range of motion of their R MF to [facility] for ADL/IADL completion. 4) Patient will demonstrate increased /pinch strength of at least 10 / 5 pinch pounds of their R hand. 5) Patient to report decreased pain in their affected R upper extremity from 4/10 to 2/10 or less with resistive functional use. 6) Increase in fine motor function as evidenced by decreased time to complete 9-hole peg test and/or MRMT test by at least 5-10 seconds. 7) Patient to report 100% compliance with their splint wear, care, and precautions if needed. 8) Patient will be knowledgeable of edema control techniques as evident with decreases from moderate to mild/none. 9) Patient will demonstrate a non-tender/non-adherent scar. 10) Patient will decrease QuickDASH score to 25% or less for increased participation in daily functional activities.          Pain Level: 0/10 at rest and 2/10 with activity, mostly tightness and some burning. Subjective: \"I snagged both of my pins on different things and one fell out and the other one I pulled out because it was so loose. \"  Objective:  Updated POC to be completed by 12/3/21    INTERVENTION: COMPLETED: SPECIFICS/COMMENTS:   Modality:     MH x5 mins          AROM:     RIF x -Blocked AROM of MP, PIP, and DIP   RMF x -Blocked AROM of MP and PIP. AAROM:               PROM/Stretching:     RIF x -PROM of MP, PIP, and DIP   RMF x -PROM of MP and PIP only (No DIP ROM)   Scar Mass/Edema Control:     Soft tissue massage x    Scar massage x    Retrograde massage x    Strengthening:               Other:     Edema glove issued x Decrease edema (overnight use)           Assessment/Comments: Pt is making Fair + progress toward stated plan of care. Pt. Tolerated all exercises and ROM. Edema glove issued to decrease edema. Pt. To contact doctor today regarding early self pin removal. Will continue to advance as tolerated. -Rehab Potential: Good  -Requires OT Follow Up: Yes  Time In: 0810          Time Out: 3104           Visit #:2    Treatment Charges: Mins Units   Modalities     Ther Exercise 20 1   Manual Therapy 25 2   Thera Activities     ADL/Home Mgt      Neuro Re-education     Group Therapy     Non-Billable Service Time 5 0   Other     Total Time/Units 45 3       -Response to Treatment: F+. Pt. Has good understanding of exercises, but hasn't been wearing his splint. Goals: Goals for pt can be see on initial eval occurring on 11/3/21  Plan:   [x]  Continues Plan of care: Continue with ROM, Scar massage and edema control next session. Pt education continues at each visit to obtain maximum benefits from skilled OT intervention.   []  [address] [facility] of care:   []  Discharge:      Shellie RICCI/CIRA [id number]

## 2021-11-12 ENCOUNTER — TELEPHONE (OUTPATIENT)
Dept: ORTHOPEDIC SURGERY | Age: 41
End: 2021-11-12

## 2021-11-12 DIAGNOSIS — S62.622B DISPLACED FRACTURE OF MIDDLE PHALANX OF RIGHT MIDDLE FINGER, INITIAL ENCOUNTER FOR OPEN FRACTURE: Primary | ICD-10-CM

## 2021-11-15 ENCOUNTER — OFFICE VISIT (OUTPATIENT)
Dept: ORTHOPEDIC SURGERY | Age: 41
End: 2021-11-15

## 2021-11-15 VITALS — WEIGHT: 200 LBS | RESPIRATION RATE: 18 BRPM | HEIGHT: 72 IN | BODY MASS INDEX: 27.09 KG/M2

## 2021-11-15 DIAGNOSIS — S62.622B DISPLACED FRACTURE OF MIDDLE PHALANX OF RIGHT MIDDLE FINGER, INITIAL ENCOUNTER FOR OPEN FRACTURE: Primary | ICD-10-CM

## 2021-11-15 LAB
FUNGUS (MYCOLOGY) CULTURE: NORMAL
FUNGUS STAIN: NORMAL

## 2021-11-15 PROCEDURE — 99024 POSTOP FOLLOW-UP VISIT: CPT | Performed by: ORTHOPAEDIC SURGERY

## 2021-11-30 LAB
AFB CULTURE (MYCOBACTERIA): NORMAL
AFB SMEAR: NORMAL

## 2023-07-16 ENCOUNTER — HOSPITAL ENCOUNTER (EMERGENCY)
Age: 43
Discharge: HOME | End: 2023-07-16
Payer: COMMERCIAL

## 2023-07-16 VITALS
SYSTOLIC BLOOD PRESSURE: 133 MMHG | TEMPERATURE: 98.4 F | DIASTOLIC BLOOD PRESSURE: 91 MMHG | WEIGHT: 170 LBS | HEART RATE: 71 BPM | OXYGEN SATURATION: 100 % | BODY MASS INDEX: 23.06 KG/M2 | RESPIRATION RATE: 18 BRPM

## 2023-07-16 DIAGNOSIS — J02.0 STREP PHARYNGITIS: Primary | ICD-10-CM

## 2023-07-16 LAB
SPECIMEN SOURCE: ABNORMAL
STREP A, MOLECULAR: POSITIVE

## 2023-07-16 PROCEDURE — 99211 OFF/OP EST MAY X REQ PHY/QHP: CPT

## 2023-07-16 RX ORDER — AMOXICILLIN 500 MG/1
500 CAPSULE ORAL 3 TIMES DAILY
Qty: 30 CAPSULE | Refills: 0 | Status: SHIPPED | OUTPATIENT
Start: 2023-07-16 | End: 2023-07-26

## 2024-08-04 ENCOUNTER — HOSPITAL ENCOUNTER (EMERGENCY)
Age: 44
Discharge: HOME | End: 2024-08-04
Payer: COMMERCIAL

## 2024-08-04 VITALS
DIASTOLIC BLOOD PRESSURE: 101 MMHG | RESPIRATION RATE: 18 BRPM | WEIGHT: 170 LBS | BODY MASS INDEX: 23.06 KG/M2 | SYSTOLIC BLOOD PRESSURE: 146 MMHG | TEMPERATURE: 97.9 F | HEART RATE: 66 BPM | OXYGEN SATURATION: 100 %

## 2024-08-04 DIAGNOSIS — Z51.89 VISIT FOR WOUND CHECK: Primary | ICD-10-CM

## 2024-08-04 PROCEDURE — 90471 IMMUNIZATION ADMIN: CPT | Performed by: PHYSICIAN ASSISTANT

## 2024-08-04 PROCEDURE — 90715 TDAP VACCINE 7 YRS/> IM: CPT | Performed by: PHYSICIAN ASSISTANT

## 2024-08-04 PROCEDURE — 6360000002 HC RX W HCPCS: Performed by: PHYSICIAN ASSISTANT

## 2024-08-04 PROCEDURE — 99211 OFF/OP EST MAY X REQ PHY/QHP: CPT

## 2024-08-04 RX ORDER — DOXYCYCLINE HYCLATE 100 MG
100 TABLET ORAL 2 TIMES DAILY
Qty: 20 TABLET | Refills: 0 | Status: SHIPPED | OUTPATIENT
Start: 2024-08-04 | End: 2024-08-14

## 2024-08-04 RX ORDER — IBUPROFEN 800 MG/1
800 TABLET ORAL EVERY 8 HOURS PRN
Qty: 28 TABLET | Refills: 0 | Status: SHIPPED | OUTPATIENT
Start: 2024-08-04

## 2024-08-04 RX ADMIN — TETANUS TOXOID, REDUCED DIPHTHERIA TOXOID AND ACELLULAR PERTUSSIS VACCINE, ADSORBED 0.5 ML: 5; 2.5; 8; 8; 2.5 SUSPENSION INTRAMUSCULAR at 11:14

## 2024-08-04 NOTE — ED PROVIDER NOTES
CHENG Stiles (electronically signed) on 8/4/2024 at 11:12 AM         Sathya Stiles PA-C  08/04/24 111

## 2024-11-24 ENCOUNTER — HOSPITAL ENCOUNTER (EMERGENCY)
Age: 44
Discharge: HOME | End: 2024-11-24

## 2024-11-24 VITALS
WEIGHT: 175 LBS | TEMPERATURE: 98.6 F | RESPIRATION RATE: 18 BRPM | SYSTOLIC BLOOD PRESSURE: 136 MMHG | DIASTOLIC BLOOD PRESSURE: 90 MMHG | HEART RATE: 78 BPM | OXYGEN SATURATION: 95 % | BODY MASS INDEX: 23.73 KG/M2

## 2024-11-24 DIAGNOSIS — L03.213 PRESEPTAL CELLULITIS OF RIGHT LOWER EYELID: Primary | ICD-10-CM

## 2024-11-24 PROCEDURE — 99211 OFF/OP EST MAY X REQ PHY/QHP: CPT

## 2024-11-24 ASSESSMENT — PAIN DESCRIPTION - LOCATION: LOCATION: EYE

## 2024-11-24 ASSESSMENT — VISUAL ACUITY
OS: 20/25
OU: OU 20/20
OD: 20/25

## 2024-11-24 ASSESSMENT — PAIN DESCRIPTION - DESCRIPTORS: DESCRIPTORS: ACHING

## 2024-11-24 ASSESSMENT — PAIN - FUNCTIONAL ASSESSMENT: PAIN_FUNCTIONAL_ASSESSMENT: 0-10

## 2024-11-24 ASSESSMENT — PAIN DESCRIPTION - ORIENTATION: ORIENTATION: RIGHT

## 2024-11-24 ASSESSMENT — PAIN SCALES - GENERAL: PAINLEVEL_OUTOF10: 4

## 2024-11-24 NOTE — ED PROVIDER NOTES
Independent [facility] Visit.    HPI:  11/24/24,   Time: 9:22 AM CUCA Narayanan is a 44 y.o. male presenting to the ED for eye pain.  Patient states his right eye started to become painful about 2 days ago.  Over the past 2 days it has gotten worse.  His lower eyelid has become erythematous and swollen.  No painful eye movements or blurry vision.  No contact lens use or injury/trauma.  He does not have an eye doctor.  Is never had this happen before.  Denies drainage from the eye.  No fevers.  Has not tried anything for symptoms.  Nothing makes it better or worse.    ROS:   Pertinent positives and negatives are stated within HPI, all other systems reviewed and are negative.  --------------------------------------------- PAST HISTORY ---------------------------------------------  Past Medical History:  has a past medical history of Anxiety, Bipolar 1 disorder (HCC), Depression, Hypertension, and Laceration of finger.    Past Surgical History:  has a past surgical history that includes Neck surgery; Hernia repair; Finger surgery (Left); hernia repair; Hand surgery (Right, 10/13/2021); and Finger Closed Reduction (Right, 10/13/2021).    Social History:  reports that he has been smoking cigarettes. He has a 10 pack-year smoking history. He has quit using smokeless tobacco. He reports that he does not currently use alcohol. He reports current drug use. Drug: Marijuana (Weed).    Family History: family history includes COPD in his father; Hypertension in his father.     The patient’s home medications have been reviewed.    Allergies: Patient has no known allergies.    -------------------------------------------------- RESULTS -------------------------------------------------  All laboratory and radiology results have been personally reviewed by myself   LABS:  No results found for this visit on 11/24/24.    RADIOLOGY:  Interpreted by Radiologist.  No orders to display       ------------------------- NURSING NOTES AND

## 2025-03-18 LAB
APPEARANCE: ABNORMAL
BODY FLD TYPE: ABNORMAL
CLOT CHECK: ABNORMAL
COLOR FLUID: ABNORMAL
MONO/MACROPHAGE FLUID: 5 %
NEUTROPHIL, FLUID: 95 %
RBC, SYNOVIAL FLUID: ABNORMAL CELLS/UL
WBC COUNT, SYNOVIAL FLUID: ABNORMAL CELLS/UL

## 2025-03-21 LAB
MICROORGANISM SPEC CULT: ABNORMAL
MICROORGANISM/AGENT SPEC: ABNORMAL
SPECIMEN DESCRIPTION: ABNORMAL

## (undated) DEVICE — Device

## (undated) DEVICE — CRADLE ARM W8.75XH12.5XL16IN FOAM SUPP ELEVATION VENT

## (undated) DEVICE — GLOVE SURG SZ 6 THK91MIL LTX FREE SYN POLYISOPRENE ANTI

## (undated) DEVICE — SURGICAL PROCEDURE PACK HND

## (undated) DEVICE — ZIMMER® STERILE DISPOSABLE TOURNIQUET CUFF WITH PLC, DUAL PORT, SINGLE BLADDER, 18 IN. (46 CM)

## (undated) DEVICE — PADDING,UNDERCAST,COTTON, 3X4YD STERILE: Brand: MEDLINE

## (undated) DEVICE — DRAPE CARM MINI FOR IMAG SYS INSIGHT FLROSCN

## (undated) DEVICE — CLOTH SURG PREP PREOPERATIVE CHLORHEXIDINE GLUC 2% READYPREP

## (undated) DEVICE — GLOVE ORANGE PI 8 1/2   MSG9085

## (undated) DEVICE — PADDING UNDERCAST W4INXL4YD COT FBR LO LINTING WYTEX

## (undated) DEVICE — TRAY DRILL SYSTEM 4 REUSABLE

## (undated) DEVICE — SOLUTION IV IRRIG WATER 1000ML POUR BRL 2F7114

## (undated) DEVICE — SPLINT ORTH W4XL15IN PLSTR OF PARIS LO EXOTHERM SMOOTH

## (undated) DEVICE — SPECIMEN TRAP

## (undated) DEVICE — K WIRE FIX L6IN DIA0.045IN 1600645] MICROAIRE SURGICAL INSTRUMENTS INC]
Type: IMPLANTABLE DEVICE | Site: HAND | Status: NON-FUNCTIONAL
Removed: 2021-10-13

## (undated) DEVICE — LEAD HAND

## (undated) DEVICE — 4-PORT MANIFOLD: Brand: NEPTUNE 2

## (undated) DEVICE — GOWN,SIRUS,FABRNF,XL,20/CS: Brand: MEDLINE

## (undated) DEVICE — DOUBLE BASIN SET: Brand: MEDLINE INDUSTRIES, INC.

## (undated) DEVICE — SINGLE USE DEVICE INTENDED TO COVER EXPOSED ENDS OF ORTHOPEDIC PIN AND K-WIRES TO HELP PROTECT THE EXPOSED WIRE FROM SNAGGING ON CLOTHING.: Brand: OXBORO™ PIN COVER

## (undated) DEVICE — SOLUTION IV IRRIG POUR BRL 0.9% SODIUM CHL 2F7124

## (undated) DEVICE — BNDG,ELSTC,MATRIX,STRL,2"X5YD,LF,HOOK&LP: Brand: MEDLINE

## (undated) DEVICE — INSTRUMENT SYSTEM 4 BATTERY REUSABLE

## (undated) DEVICE — INTENDED FOR TISSUE SEPARATION, AND OTHER PROCEDURES THAT REQUIRE A SHARP SURGICAL BLADE TO PUNCTURE OR CUT.: Brand: BARD-PARKER ® STAINLESS STEEL BLADES

## (undated) DEVICE — GLOVE SURG SZ 65 THK91MIL LTX FREE SYN POLYISOPRENE

## (undated) DEVICE — PADDING CAST W2INXL4YD COT LO LINTING WYTEX

## (undated) DEVICE — TRAY SET HAND REUSABLE

## (undated) DEVICE — COVER HNDL LT DISP

## (undated) DEVICE — OSTEOTOME SURG L5IN BLDE W15MM STR MINI LAMBOTTE